# Patient Record
Sex: FEMALE | Race: WHITE | NOT HISPANIC OR LATINO
[De-identification: names, ages, dates, MRNs, and addresses within clinical notes are randomized per-mention and may not be internally consistent; named-entity substitution may affect disease eponyms.]

---

## 2017-01-10 ENCOUNTER — APPOINTMENT (OUTPATIENT)
Dept: NEUROSURGERY | Facility: CLINIC | Age: 62
End: 2017-01-10

## 2017-01-10 VITALS
SYSTOLIC BLOOD PRESSURE: 99 MMHG | BODY MASS INDEX: 28.81 KG/M2 | HEART RATE: 75 BPM | HEIGHT: 65.5 IN | WEIGHT: 175 LBS | DIASTOLIC BLOOD PRESSURE: 66 MMHG

## 2017-02-14 ENCOUNTER — FORM ENCOUNTER (OUTPATIENT)
Age: 62
End: 2017-02-14

## 2017-02-15 ENCOUNTER — OUTPATIENT (OUTPATIENT)
Dept: OUTPATIENT SERVICES | Facility: HOSPITAL | Age: 62
LOS: 1 days | End: 2017-02-15
Payer: COMMERCIAL

## 2017-02-15 ENCOUNTER — APPOINTMENT (OUTPATIENT)
Dept: MRI IMAGING | Facility: HOSPITAL | Age: 62
End: 2017-02-15

## 2017-02-15 DIAGNOSIS — G25.9 EXTRAPYRAMIDAL AND MOVEMENT DISORDER, UNSPECIFIED: ICD-10-CM

## 2017-02-15 DIAGNOSIS — G93.9 DISORDER OF BRAIN, UNSPECIFIED: ICD-10-CM

## 2017-02-15 PROCEDURE — 70553 MRI BRAIN STEM W/O & W/DYE: CPT

## 2017-02-15 PROCEDURE — 70553 MRI BRAIN STEM W/O & W/DYE: CPT | Mod: 26

## 2017-02-15 PROCEDURE — A9585: CPT

## 2017-02-24 ENCOUNTER — OUTPATIENT (OUTPATIENT)
Dept: OUTPATIENT SERVICES | Facility: HOSPITAL | Age: 62
LOS: 1 days | End: 2017-02-24
Payer: COMMERCIAL

## 2017-02-24 VITALS
HEART RATE: 55 BPM | SYSTOLIC BLOOD PRESSURE: 99 MMHG | TEMPERATURE: 98 F | WEIGHT: 176.37 LBS | OXYGEN SATURATION: 99 % | HEIGHT: 65 IN | RESPIRATION RATE: 15 BRPM | DIASTOLIC BLOOD PRESSURE: 65 MMHG

## 2017-02-24 DIAGNOSIS — G25.0 ESSENTIAL TREMOR: ICD-10-CM

## 2017-02-24 DIAGNOSIS — Z90.89 ACQUIRED ABSENCE OF OTHER ORGANS: Chronic | ICD-10-CM

## 2017-02-24 DIAGNOSIS — Z01.818 ENCOUNTER FOR OTHER PREPROCEDURAL EXAMINATION: ICD-10-CM

## 2017-02-24 DIAGNOSIS — Z90.12 ACQUIRED ABSENCE OF LEFT BREAST AND NIPPLE: Chronic | ICD-10-CM

## 2017-02-24 DIAGNOSIS — Z79.82 LONG TERM (CURRENT) USE OF ASPIRIN: ICD-10-CM

## 2017-02-24 LAB
ANION GAP SERPL CALC-SCNC: 15 MMOL/L — SIGNIFICANT CHANGE UP (ref 5–17)
BLD GP AB SCN SERPL QL: NEGATIVE — SIGNIFICANT CHANGE UP
BUN SERPL-MCNC: 12 MG/DL — SIGNIFICANT CHANGE UP (ref 7–23)
CALCIUM SERPL-MCNC: 10.2 MG/DL — SIGNIFICANT CHANGE UP (ref 8.4–10.5)
CHLORIDE SERPL-SCNC: 101 MMOL/L — SIGNIFICANT CHANGE UP (ref 96–108)
CO2 SERPL-SCNC: 25 MMOL/L — SIGNIFICANT CHANGE UP (ref 22–31)
CREAT SERPL-MCNC: 0.82 MG/DL — SIGNIFICANT CHANGE UP (ref 0.5–1.3)
GLUCOSE SERPL-MCNC: 77 MG/DL — SIGNIFICANT CHANGE UP (ref 70–99)
HCT VFR BLD CALC: 39.3 % — SIGNIFICANT CHANGE UP (ref 34.5–45)
HGB BLD-MCNC: 13 G/DL — SIGNIFICANT CHANGE UP (ref 11.5–15.5)
MCHC RBC-ENTMCNC: 31.9 PG — SIGNIFICANT CHANGE UP (ref 27–34)
MCHC RBC-ENTMCNC: 33.1 GM/DL — SIGNIFICANT CHANGE UP (ref 32–36)
MCV RBC AUTO: 96.6 FL — SIGNIFICANT CHANGE UP (ref 80–100)
PLATELET # BLD AUTO: 300 K/UL — SIGNIFICANT CHANGE UP (ref 150–400)
POTASSIUM SERPL-MCNC: 4.5 MMOL/L — SIGNIFICANT CHANGE UP (ref 3.5–5.3)
POTASSIUM SERPL-SCNC: 4.5 MMOL/L — SIGNIFICANT CHANGE UP (ref 3.5–5.3)
RBC # BLD: 4.07 M/UL — SIGNIFICANT CHANGE UP (ref 3.8–5.2)
RBC # FLD: 14 % — SIGNIFICANT CHANGE UP (ref 10.3–14.5)
RH IG SCN BLD-IMP: POSITIVE — SIGNIFICANT CHANGE UP
SODIUM SERPL-SCNC: 141 MMOL/L — SIGNIFICANT CHANGE UP (ref 135–145)
WBC # BLD: 3.91 K/UL — SIGNIFICANT CHANGE UP (ref 3.8–10.5)
WBC # FLD AUTO: 3.91 K/UL — SIGNIFICANT CHANGE UP (ref 3.8–10.5)

## 2017-02-24 RX ORDER — SODIUM CHLORIDE 9 MG/ML
3 INJECTION INTRAMUSCULAR; INTRAVENOUS; SUBCUTANEOUS EVERY 8 HOURS
Qty: 0 | Refills: 0 | Status: DISCONTINUED | OUTPATIENT
Start: 2017-03-02 | End: 2017-03-03

## 2017-02-24 NOTE — H&P PST ADULT - HISTORY OF PRESENT ILLNESS
60 yo female with essential tremor, who is intolerant of medication dosage required to adequately manage tremor, presents for left VIM DBS placement on 3/2/17.

## 2017-02-24 NOTE — H&P PST ADULT - PROBLEM SELECTOR PLAN 1
Stereotactic frame placement  Left VIM DBS  Pt states that she believes she was instructed to hold anti-tremor meds on am of surgery. Call placed and message left with Dr Redding's office to verify instructions. Per Dr Redding's office, he will contact patient with instructions regarding Inderal and Klonopin on am of surgery.

## 2017-02-24 NOTE — H&P PST ADULT - PMH
Breast cancer    Essential tremor    Hyperlipidemia Anxiety    Breast cancer  2009 left - Stage 1 - s/p left mastectomy - no chemo/radiation  Essential tremor    Hyperlipidemia

## 2017-02-24 NOTE — H&P PST ADULT - NSANTHOSAYNRD_GEN_A_CORE
No. ROSANGELA screening performed.  STOP BANG Legend: 0-2 = LOW Risk; 3-4 = INTERMEDIATE Risk; 5-8 = HIGH Risk

## 2017-03-02 ENCOUNTER — APPOINTMENT (OUTPATIENT)
Dept: NEUROSURGERY | Facility: HOSPITAL | Age: 62
End: 2017-03-02

## 2017-03-02 ENCOUNTER — TRANSCRIPTION ENCOUNTER (OUTPATIENT)
Age: 62
End: 2017-03-02

## 2017-03-02 ENCOUNTER — INPATIENT (INPATIENT)
Facility: HOSPITAL | Age: 62
LOS: 0 days | Discharge: HOME | DRG: 27 | End: 2017-03-03
Attending: STUDENT IN AN ORGANIZED HEALTH CARE EDUCATION/TRAINING PROGRAM | Admitting: STUDENT IN AN ORGANIZED HEALTH CARE EDUCATION/TRAINING PROGRAM
Payer: COMMERCIAL

## 2017-03-02 VITALS
RESPIRATION RATE: 18 BRPM | HEIGHT: 65 IN | TEMPERATURE: 37 F | OXYGEN SATURATION: 99 % | WEIGHT: 176.37 LBS | DIASTOLIC BLOOD PRESSURE: 64 MMHG | SYSTOLIC BLOOD PRESSURE: 103 MMHG | HEART RATE: 50 BPM

## 2017-03-02 DIAGNOSIS — Z90.89 ACQUIRED ABSENCE OF OTHER ORGANS: Chronic | ICD-10-CM

## 2017-03-02 DIAGNOSIS — Z90.12 ACQUIRED ABSENCE OF LEFT BREAST AND NIPPLE: Chronic | ICD-10-CM

## 2017-03-02 DIAGNOSIS — G25.0 ESSENTIAL TREMOR: ICD-10-CM

## 2017-03-02 LAB
ANION GAP SERPL CALC-SCNC: 8 MMOL/L — SIGNIFICANT CHANGE UP (ref 5–17)
BASOPHILS # BLD AUTO: 0 K/UL — SIGNIFICANT CHANGE UP (ref 0–0.2)
BASOPHILS NFR BLD AUTO: 0.6 % — SIGNIFICANT CHANGE UP (ref 0–2)
CHLORIDE SERPL-SCNC: 107 MMOL/L — SIGNIFICANT CHANGE UP (ref 96–108)
CO2 SERPL-SCNC: 28 MMOL/L — SIGNIFICANT CHANGE UP (ref 22–31)
EOSINOPHIL # BLD AUTO: 0.1 K/UL — SIGNIFICANT CHANGE UP (ref 0–0.5)
EOSINOPHIL NFR BLD AUTO: 2.6 % — SIGNIFICANT CHANGE UP (ref 0–6)
HCT VFR BLD CALC: 37.4 % — SIGNIFICANT CHANGE UP (ref 34.5–45)
HGB BLD-MCNC: 12.4 G/DL — SIGNIFICANT CHANGE UP (ref 11.5–15.5)
LYMPHOCYTES # BLD AUTO: 1.6 K/UL — SIGNIFICANT CHANGE UP (ref 1–3.3)
LYMPHOCYTES # BLD AUTO: 30.2 % — SIGNIFICANT CHANGE UP (ref 13–44)
MCHC RBC-ENTMCNC: 32.3 PG — SIGNIFICANT CHANGE UP (ref 27–34)
MCHC RBC-ENTMCNC: 33.2 GM/DL — SIGNIFICANT CHANGE UP (ref 32–36)
MCV RBC AUTO: 97.2 FL — SIGNIFICANT CHANGE UP (ref 80–100)
MONOCYTES # BLD AUTO: 0.4 K/UL — SIGNIFICANT CHANGE UP (ref 0–0.9)
MONOCYTES NFR BLD AUTO: 8.5 % — SIGNIFICANT CHANGE UP (ref 2–14)
NEUTROPHILS # BLD AUTO: 3 K/UL — SIGNIFICANT CHANGE UP (ref 1.8–7.4)
NEUTROPHILS NFR BLD AUTO: 58.1 % — SIGNIFICANT CHANGE UP (ref 43–77)
PLATELET # BLD AUTO: 236 K/UL — SIGNIFICANT CHANGE UP (ref 150–400)
POTASSIUM SERPL-MCNC: 5.4 MMOL/L — HIGH (ref 3.5–5.3)
POTASSIUM SERPL-SCNC: 5.4 MMOL/L — HIGH (ref 3.5–5.3)
RBC # BLD: 3.85 M/UL — SIGNIFICANT CHANGE UP (ref 3.8–5.2)
RBC # FLD: 12.2 % — SIGNIFICANT CHANGE UP (ref 10.3–14.5)
RH IG SCN BLD-IMP: POSITIVE — SIGNIFICANT CHANGE UP
SODIUM SERPL-SCNC: 143 MMOL/L — SIGNIFICANT CHANGE UP (ref 135–145)
WBC # BLD: 5.2 K/UL — SIGNIFICANT CHANGE UP (ref 3.8–10.5)
WBC # FLD AUTO: 5.2 K/UL — SIGNIFICANT CHANGE UP (ref 3.8–10.5)

## 2017-03-02 PROCEDURE — 61867 IMPLANT NEUROELECTRODE: CPT

## 2017-03-02 PROCEDURE — 70450 CT HEAD/BRAIN W/O DYE: CPT | Mod: 26,77

## 2017-03-02 PROCEDURE — 70450 CT HEAD/BRAIN W/O DYE: CPT | Mod: 26

## 2017-03-02 RX ORDER — SODIUM CHLORIDE 9 MG/ML
1000 INJECTION INTRAMUSCULAR; INTRAVENOUS; SUBCUTANEOUS
Qty: 0 | Refills: 0 | Status: DISCONTINUED | OUTPATIENT
Start: 2017-03-02 | End: 2017-03-03

## 2017-03-02 RX ORDER — ONDANSETRON 8 MG/1
4 TABLET, FILM COATED ORAL ONCE
Qty: 0 | Refills: 0 | Status: DISCONTINUED | OUTPATIENT
Start: 2017-03-02 | End: 2017-03-02

## 2017-03-02 RX ORDER — ACETAMINOPHEN 500 MG
650 TABLET ORAL EVERY 6 HOURS
Qty: 0 | Refills: 0 | Status: DISCONTINUED | OUTPATIENT
Start: 2017-03-02 | End: 2017-03-03

## 2017-03-02 RX ORDER — HYDROMORPHONE HYDROCHLORIDE 2 MG/ML
0.5 INJECTION INTRAMUSCULAR; INTRAVENOUS; SUBCUTANEOUS
Qty: 0 | Refills: 0 | Status: DISCONTINUED | OUTPATIENT
Start: 2017-03-02 | End: 2017-03-02

## 2017-03-02 RX ORDER — CITALOPRAM 10 MG/1
20 TABLET, FILM COATED ORAL DAILY
Qty: 0 | Refills: 0 | Status: DISCONTINUED | OUTPATIENT
Start: 2017-03-02 | End: 2017-03-03

## 2017-03-02 RX ORDER — CLONAZEPAM 1 MG
1 TABLET ORAL THREE TIMES A DAY
Qty: 0 | Refills: 0 | Status: DISCONTINUED | OUTPATIENT
Start: 2017-03-02 | End: 2017-03-03

## 2017-03-02 RX ORDER — PROPRANOLOL HCL 160 MG
20 CAPSULE, EXTENDED RELEASE 24HR ORAL THREE TIMES A DAY
Qty: 0 | Refills: 0 | Status: DISCONTINUED | OUTPATIENT
Start: 2017-03-02 | End: 2017-03-03

## 2017-03-02 RX ORDER — ATORVASTATIN CALCIUM 80 MG/1
10 TABLET, FILM COATED ORAL AT BEDTIME
Qty: 0 | Refills: 0 | Status: DISCONTINUED | OUTPATIENT
Start: 2017-03-02 | End: 2017-03-03

## 2017-03-02 RX ORDER — PANTOPRAZOLE SODIUM 20 MG/1
40 TABLET, DELAYED RELEASE ORAL DAILY
Qty: 0 | Refills: 0 | Status: DISCONTINUED | OUTPATIENT
Start: 2017-03-02 | End: 2017-03-03

## 2017-03-02 RX ORDER — DOCUSATE SODIUM 100 MG
100 CAPSULE ORAL THREE TIMES A DAY
Qty: 0 | Refills: 0 | Status: DISCONTINUED | OUTPATIENT
Start: 2017-03-02 | End: 2017-03-03

## 2017-03-02 RX ORDER — DEXTROSE MONOHYDRATE, SODIUM CHLORIDE, AND POTASSIUM CHLORIDE 50; .745; 4.5 G/1000ML; G/1000ML; G/1000ML
1000 INJECTION, SOLUTION INTRAVENOUS
Qty: 0 | Refills: 0 | Status: DISCONTINUED | OUTPATIENT
Start: 2017-03-02 | End: 2017-03-02

## 2017-03-02 RX ORDER — SENNA PLUS 8.6 MG/1
2 TABLET ORAL AT BEDTIME
Qty: 0 | Refills: 0 | Status: DISCONTINUED | OUTPATIENT
Start: 2017-03-02 | End: 2017-03-03

## 2017-03-02 RX ADMIN — SODIUM CHLORIDE 3 MILLILITER(S): 9 INJECTION INTRAMUSCULAR; INTRAVENOUS; SUBCUTANEOUS at 23:13

## 2017-03-02 RX ADMIN — Medication 1 MILLIGRAM(S): at 23:12

## 2017-03-02 RX ADMIN — SODIUM CHLORIDE 75 MILLILITER(S): 9 INJECTION INTRAMUSCULAR; INTRAVENOUS; SUBCUTANEOUS at 18:57

## 2017-03-02 RX ADMIN — Medication 100 MILLIGRAM(S): at 17:32

## 2017-03-02 RX ADMIN — Medication 100 MILLIGRAM(S): at 23:12

## 2017-03-02 RX ADMIN — SODIUM CHLORIDE 3 MILLILITER(S): 9 INJECTION INTRAMUSCULAR; INTRAVENOUS; SUBCUTANEOUS at 17:01

## 2017-03-02 RX ADMIN — CITALOPRAM 20 MILLIGRAM(S): 10 TABLET, FILM COATED ORAL at 23:12

## 2017-03-02 RX ADMIN — PANTOPRAZOLE SODIUM 40 MILLIGRAM(S): 20 TABLET, DELAYED RELEASE ORAL at 23:12

## 2017-03-02 RX ADMIN — SODIUM CHLORIDE 75 MILLILITER(S): 9 INJECTION INTRAMUSCULAR; INTRAVENOUS; SUBCUTANEOUS at 23:13

## 2017-03-02 RX ADMIN — ATORVASTATIN CALCIUM 10 MILLIGRAM(S): 80 TABLET, FILM COATED ORAL at 23:12

## 2017-03-02 NOTE — PATIENT PROFILE ADULT. - PMH
Anxiety    Breast cancer  2009 left - Stage 1 - s/p left mastectomy - no chemo/radiation  Essential tremor    Hyperlipidemia

## 2017-03-03 VITALS
DIASTOLIC BLOOD PRESSURE: 69 MMHG | SYSTOLIC BLOOD PRESSURE: 114 MMHG | RESPIRATION RATE: 16 BRPM | OXYGEN SATURATION: 98 % | TEMPERATURE: 99 F | HEART RATE: 73 BPM

## 2017-03-03 LAB
ANION GAP SERPL CALC-SCNC: 13 MMOL/L — SIGNIFICANT CHANGE UP (ref 5–17)
BUN SERPL-MCNC: 9 MG/DL — SIGNIFICANT CHANGE UP (ref 7–23)
CALCIUM SERPL-MCNC: 9.2 MG/DL — SIGNIFICANT CHANGE UP (ref 8.4–10.5)
CHLORIDE SERPL-SCNC: 102 MMOL/L — SIGNIFICANT CHANGE UP (ref 96–108)
CO2 SERPL-SCNC: 24 MMOL/L — SIGNIFICANT CHANGE UP (ref 22–31)
CREAT SERPL-MCNC: 0.67 MG/DL — SIGNIFICANT CHANGE UP (ref 0.5–1.3)
GLUCOSE SERPL-MCNC: 130 MG/DL — HIGH (ref 70–99)
HCT VFR BLD CALC: 39.7 % — SIGNIFICANT CHANGE UP (ref 34.5–45)
HGB BLD-MCNC: 13.2 G/DL — SIGNIFICANT CHANGE UP (ref 11.5–15.5)
MCHC RBC-ENTMCNC: 31.7 PG — SIGNIFICANT CHANGE UP (ref 27–34)
MCHC RBC-ENTMCNC: 33.2 GM/DL — SIGNIFICANT CHANGE UP (ref 32–36)
MCV RBC AUTO: 95.5 FL — SIGNIFICANT CHANGE UP (ref 80–100)
PLATELET # BLD AUTO: 212 K/UL — SIGNIFICANT CHANGE UP (ref 150–400)
POTASSIUM SERPL-MCNC: 4.1 MMOL/L — SIGNIFICANT CHANGE UP (ref 3.5–5.3)
POTASSIUM SERPL-SCNC: 4.1 MMOL/L — SIGNIFICANT CHANGE UP (ref 3.5–5.3)
RBC # BLD: 4.15 M/UL — SIGNIFICANT CHANGE UP (ref 3.8–5.2)
RBC # FLD: 12.6 % — SIGNIFICANT CHANGE UP (ref 10.3–14.5)
SODIUM SERPL-SCNC: 139 MMOL/L — SIGNIFICANT CHANGE UP (ref 135–145)
WBC # BLD: 11.9 K/UL — HIGH (ref 3.8–10.5)
WBC # FLD AUTO: 11.9 K/UL — HIGH (ref 3.8–10.5)

## 2017-03-03 PROCEDURE — 97161 PT EVAL LOW COMPLEX 20 MIN: CPT

## 2017-03-03 PROCEDURE — 80048 BASIC METABOLIC PNL TOTAL CA: CPT

## 2017-03-03 PROCEDURE — 86900 BLOOD TYPING SEROLOGIC ABO: CPT

## 2017-03-03 PROCEDURE — 85027 COMPLETE CBC AUTOMATED: CPT

## 2017-03-03 PROCEDURE — C1713: CPT

## 2017-03-03 PROCEDURE — 76000 FLUOROSCOPY <1 HR PHYS/QHP: CPT

## 2017-03-03 PROCEDURE — C1889: CPT

## 2017-03-03 PROCEDURE — 80051 ELECTROLYTE PANEL: CPT

## 2017-03-03 PROCEDURE — 86901 BLOOD TYPING SEROLOGIC RH(D): CPT

## 2017-03-03 PROCEDURE — C1778: CPT

## 2017-03-03 PROCEDURE — 70450 CT HEAD/BRAIN W/O DYE: CPT

## 2017-03-03 RX ORDER — ACETAMINOPHEN 500 MG
2 TABLET ORAL
Qty: 0 | Refills: 0 | COMMUNITY
Start: 2017-03-03

## 2017-03-03 RX ORDER — SODIUM CHLORIDE 9 MG/ML
500 INJECTION INTRAMUSCULAR; INTRAVENOUS; SUBCUTANEOUS ONCE
Qty: 0 | Refills: 0 | Status: COMPLETED | OUTPATIENT
Start: 2017-03-03 | End: 2017-03-03

## 2017-03-03 RX ORDER — ASPIRIN/CALCIUM CARB/MAGNESIUM 324 MG
1 TABLET ORAL
Qty: 0 | Refills: 0 | COMMUNITY

## 2017-03-03 RX ORDER — DOCUSATE SODIUM 100 MG
1 CAPSULE ORAL
Qty: 0 | Refills: 0 | COMMUNITY
Start: 2017-03-03

## 2017-03-03 RX ADMIN — Medication 20 MILLIGRAM(S): at 00:14

## 2017-03-03 RX ADMIN — SODIUM CHLORIDE 3 MILLILITER(S): 9 INJECTION INTRAMUSCULAR; INTRAVENOUS; SUBCUTANEOUS at 12:53

## 2017-03-03 RX ADMIN — PANTOPRAZOLE SODIUM 40 MILLIGRAM(S): 20 TABLET, DELAYED RELEASE ORAL at 13:11

## 2017-03-03 RX ADMIN — Medication 100 MILLIGRAM(S): at 05:26

## 2017-03-03 RX ADMIN — Medication 100 MILLIGRAM(S): at 00:18

## 2017-03-03 RX ADMIN — Medication 100 MILLIGRAM(S): at 08:32

## 2017-03-03 RX ADMIN — Medication 1 MILLIGRAM(S): at 14:16

## 2017-03-03 RX ADMIN — SODIUM CHLORIDE 75 MILLILITER(S): 9 INJECTION INTRAMUSCULAR; INTRAVENOUS; SUBCUTANEOUS at 05:26

## 2017-03-03 RX ADMIN — SODIUM CHLORIDE 75 MILLILITER(S): 9 INJECTION INTRAMUSCULAR; INTRAVENOUS; SUBCUTANEOUS at 03:30

## 2017-03-03 RX ADMIN — SODIUM CHLORIDE 3 MILLILITER(S): 9 INJECTION INTRAMUSCULAR; INTRAVENOUS; SUBCUTANEOUS at 05:26

## 2017-03-03 RX ADMIN — Medication 20 MILLIGRAM(S): at 05:26

## 2017-03-03 RX ADMIN — SODIUM CHLORIDE 1000 MILLILITER(S): 9 INJECTION INTRAMUSCULAR; INTRAVENOUS; SUBCUTANEOUS at 13:40

## 2017-03-03 RX ADMIN — Medication 1 MILLIGRAM(S): at 05:26

## 2017-03-03 RX ADMIN — Medication 20 MILLIGRAM(S): at 15:45

## 2017-03-03 RX ADMIN — CITALOPRAM 20 MILLIGRAM(S): 10 TABLET, FILM COATED ORAL at 13:11

## 2017-03-03 NOTE — DISCHARGE NOTE ADULT - REASON FOR ADMISSION
"I am having a deep brain stimulator placed." 60 yo female with essential tremor, who is intolerant of medication dosage required to adequately manage tremor, presents for left VIM DBS placement on 3/2/17.

## 2017-03-03 NOTE — DISCHARGE NOTE ADULT - NS AS ACTIVITY OBS
Showering allowed/Walking-Outdoors allowed/Stairs allowed/Do not make important decisions/Do not drive or operate machinery/No Heavy lifting/straining/Walking-Indoors allowed

## 2017-03-03 NOTE — PHYSICAL THERAPY INITIAL EVALUATION ADULT - OCCUPATION
Pt reports she lives with spouse in 3-story PH with 3-5 steps to enter & full flight to bedroom/bathroom both with CARIDAD handrails.  Pt reports she was independent with all mobility PTA, no AD for ambulation.

## 2017-03-03 NOTE — DISCHARGE NOTE ADULT - NS AS DC STROKE ED MATERIALS
Need for Followup After Discharge/Stroke Education Booklet/Stroke Warning Signs and Symptoms/Call 911 for Stroke/Risk Factors for Stroke/Prescribed Medications

## 2017-03-03 NOTE — DISCHARGE NOTE ADULT - MEDICATION SUMMARY - MEDICATIONS TO STOP TAKING
I will STOP taking the medications listed below when I get home from the hospital:    aspirin 81 mg oral tablet  -- 1 tab(s) by mouth once a day - on hold to OR

## 2017-03-03 NOTE — PHYSICAL THERAPY INITIAL EVALUATION ADULT - ADDITIONAL COMMENTS
3/02 CT head: Interval placement of a left frontal approach deep brain stimulator with   postoperative changes. No acute intracranial hemorrhage or mass effect.

## 2017-03-03 NOTE — DISCHARGE NOTE ADULT - CARE PROVIDERS DIRECT ADDRESSES
[email or web address]

## 2017-03-03 NOTE — DISCHARGE NOTE ADULT - PATIENT PORTAL LINK FT
“You can access the FollowHealth Patient Portal, offered by [facility], by registering with the following website: [email or web address]”

## 2017-03-03 NOTE — DISCHARGE NOTE ADULT - CARE PROVIDER_API CALL
Michael Redding), Neurosurgery  General  [address] [place] [address]  Phone: [phone]  Fax: [phone]

## 2017-03-03 NOTE — DISCHARGE NOTE ADULT - PLAN OF CARE
INCREASE ACTIVITY AND CONTROL TREMOR. FOLLOW UP WITH DR. BETANCOURT IN ONE WEEK   FOLLOW UP WITH PMD IN 2 WEEKS

## 2017-03-03 NOTE — DISCHARGE NOTE ADULT - HOSPITAL COURSE
PATIENT HAD DEEP BRAIN STIMULATOR PLACED FOR ESSENTIAL TREMOR ON 3/2. POST OP PATIENT WAS MONITORED IN THE PACU AND WAS TRANSFERRED TO FLOOR. ON THE FLOOR PATIENT WAS GIVEN PAIN MEDS AND WAS STARTED BACK ON ROUTINE HOME MEDS. PATIENT WAS NOT NOTICED TO HAVE ANY NEW DEFICITS AND PHYSICAL THERAPY HAS CLEARED PATIENT. RAUSCH CATHETER WAS DCD ON 3/3 AND PATIENT VOIDED.

## 2017-03-03 NOTE — DISCHARGE NOTE ADULT - ADDITIONAL INSTRUCTIONS
MAY TAKE SHOWER 4 DAYS AFTER SURGERY-LET WATER RUN OVER THE SURGICAL SITE AND PAT DRY AFTER SHOWER-KEEP SURGICAL SITE CLEAN AND DRY AT ALL TIMES.

## 2017-03-03 NOTE — PHYSICAL THERAPY INITIAL EVALUATION ADULT - TRANSFER SAFETY CONCERNS NOTED: SIT/STAND, REHAB EVAL
able to self-correct/decreased step length/decreased balance during turns/decreased weight-shifting ability

## 2017-03-03 NOTE — DISCHARGE NOTE ADULT - CARE PLAN
Principal Discharge DX:	Essential tremor  Goal:	INCREASE ACTIVITY AND CONTROL TREMOR.  Instructions for follow-up, activity and diet:	FOLLOW UP WITH DR. BETANCOURT IN ONE WEEK   FOLLOW UP WITH PMD IN 2 WEEKS  Secondary Diagnosis:	Anxiety  Secondary Diagnosis:	Hyperlipidemia  Secondary Diagnosis:	Breast cancer

## 2017-03-09 ENCOUNTER — APPOINTMENT (OUTPATIENT)
Dept: NEUROSURGERY | Facility: HOSPITAL | Age: 62
End: 2017-03-09

## 2017-03-09 ENCOUNTER — TRANSCRIPTION ENCOUNTER (OUTPATIENT)
Age: 62
End: 2017-03-09

## 2017-03-09 ENCOUNTER — OUTPATIENT (OUTPATIENT)
Dept: OUTPATIENT SERVICES | Facility: HOSPITAL | Age: 62
LOS: 1 days | Discharge: HOME | End: 2017-03-09
Payer: COMMERCIAL

## 2017-03-09 VITALS
OXYGEN SATURATION: 98 % | SYSTOLIC BLOOD PRESSURE: 123 MMHG | HEART RATE: 75 BPM | DIASTOLIC BLOOD PRESSURE: 58 MMHG | RESPIRATION RATE: 20 BRPM | TEMPERATURE: 98 F

## 2017-03-09 VITALS
SYSTOLIC BLOOD PRESSURE: 108 MMHG | HEART RATE: 63 BPM | DIASTOLIC BLOOD PRESSURE: 66 MMHG | RESPIRATION RATE: 14 BRPM | OXYGEN SATURATION: 96 %

## 2017-03-09 DIAGNOSIS — Z90.12 ACQUIRED ABSENCE OF LEFT BREAST AND NIPPLE: Chronic | ICD-10-CM

## 2017-03-09 DIAGNOSIS — G25.0 ESSENTIAL TREMOR: ICD-10-CM

## 2017-03-09 DIAGNOSIS — Z90.89 ACQUIRED ABSENCE OF OTHER ORGANS: Chronic | ICD-10-CM

## 2017-03-09 PROCEDURE — C1787: CPT

## 2017-03-09 PROCEDURE — 61885 INSRT/REDO NEUROSTIM 1 ARRAY: CPT | Mod: 58,LT

## 2017-03-09 PROCEDURE — C1767: CPT

## 2017-03-09 PROCEDURE — C1883: CPT

## 2017-03-09 PROCEDURE — 63685 INS/RPLC SPI NPG/RCVR POCKET: CPT

## 2017-03-09 RX ORDER — DOCUSATE SODIUM 100 MG
100 CAPSULE ORAL THREE TIMES A DAY
Qty: 0 | Refills: 0 | Status: DISCONTINUED | OUTPATIENT
Start: 2017-03-09 | End: 2017-03-24

## 2017-03-09 RX ORDER — SODIUM CHLORIDE 9 MG/ML
3 INJECTION INTRAMUSCULAR; INTRAVENOUS; SUBCUTANEOUS EVERY 8 HOURS
Qty: 0 | Refills: 0 | Status: DISCONTINUED | OUTPATIENT
Start: 2017-03-09 | End: 2017-03-09

## 2017-03-09 RX ORDER — PANTOPRAZOLE SODIUM 20 MG/1
40 TABLET, DELAYED RELEASE ORAL DAILY
Qty: 0 | Refills: 0 | Status: DISCONTINUED | OUTPATIENT
Start: 2017-03-09 | End: 2017-03-24

## 2017-03-09 RX ORDER — PROPRANOLOL HCL 160 MG
20 CAPSULE, EXTENDED RELEASE 24HR ORAL THREE TIMES A DAY
Qty: 0 | Refills: 0 | Status: DISCONTINUED | OUTPATIENT
Start: 2017-03-09 | End: 2017-03-24

## 2017-03-09 RX ORDER — ONDANSETRON 8 MG/1
4 TABLET, FILM COATED ORAL ONCE
Qty: 0 | Refills: 0 | Status: DISCONTINUED | OUTPATIENT
Start: 2017-03-09 | End: 2017-03-09

## 2017-03-09 RX ORDER — CITALOPRAM 10 MG/1
20 TABLET, FILM COATED ORAL DAILY
Qty: 0 | Refills: 0 | Status: DISCONTINUED | OUTPATIENT
Start: 2017-03-09 | End: 2017-03-24

## 2017-03-09 RX ORDER — SENNA PLUS 8.6 MG/1
2 TABLET ORAL AT BEDTIME
Qty: 0 | Refills: 0 | Status: DISCONTINUED | OUTPATIENT
Start: 2017-03-09 | End: 2017-03-24

## 2017-03-09 RX ORDER — OXYCODONE HYDROCHLORIDE 5 MG/1
1 TABLET ORAL
Qty: 12 | Refills: 0 | OUTPATIENT
Start: 2017-03-09 | End: 2017-03-12

## 2017-03-09 RX ORDER — HYDROMORPHONE HYDROCHLORIDE 2 MG/ML
0.5 INJECTION INTRAMUSCULAR; INTRAVENOUS; SUBCUTANEOUS
Qty: 0 | Refills: 0 | Status: DISCONTINUED | OUTPATIENT
Start: 2017-03-09 | End: 2017-03-09

## 2017-03-09 RX ORDER — ATORVASTATIN CALCIUM 80 MG/1
10 TABLET, FILM COATED ORAL AT BEDTIME
Qty: 0 | Refills: 0 | Status: DISCONTINUED | OUTPATIENT
Start: 2017-03-09 | End: 2017-03-24

## 2017-03-09 RX ORDER — CLONAZEPAM 1 MG
1 TABLET ORAL THREE TIMES A DAY
Qty: 0 | Refills: 0 | Status: COMPLETED | OUTPATIENT
Start: 2017-03-09 | End: 2017-03-16

## 2017-03-09 RX ORDER — ZOLPIDEM TARTRATE 10 MG/1
5 TABLET ORAL AT BEDTIME
Qty: 0 | Refills: 0 | Status: DISCONTINUED | OUTPATIENT
Start: 2017-03-09 | End: 2017-03-09

## 2017-03-09 RX ORDER — ONDANSETRON 8 MG/1
4 TABLET, FILM COATED ORAL EVERY 6 HOURS
Qty: 0 | Refills: 0 | Status: DISCONTINUED | OUTPATIENT
Start: 2017-03-09 | End: 2017-03-24

## 2017-03-09 RX ORDER — DEXTROSE MONOHYDRATE, SODIUM CHLORIDE, AND POTASSIUM CHLORIDE 50; .745; 4.5 G/1000ML; G/1000ML; G/1000ML
1000 INJECTION, SOLUTION INTRAVENOUS
Qty: 0 | Refills: 0 | Status: DISCONTINUED | OUTPATIENT
Start: 2017-03-09 | End: 2017-03-24

## 2017-03-09 NOTE — ASU DISCHARGE PLAN (ADULT/PEDIATRIC). - MEDICATION SUMMARY - MEDICATIONS TO TAKE
I will START or STAY ON the medications listed below when I get home from the hospital:    calcium  -- 500 milligram(s) by mouth once a day  -- Indication: For Essential tremor    acetaminophen 325 mg oral tablet  -- 2 tab(s) by mouth every 6 hours, As needed, Mild Pain (1 - 3)  -- Indication: For Essential tremor    acetaminophen-oxyCODONE 325 mg-5 mg oral tablet  -- 1 tab(s) by mouth every 6 hours, As Needed -for shortness of breath and/or wheezing -for severe pain MDD:4  -- Caution federal law prohibits the transfer of this drug to any person other  than the person for whom it was prescribed.  May cause drowsiness.  Alcohol may intensify this effect.  Use care when operating dangerous machinery.  This prescription cannot be refilled.  This product contains acetaminophen.  Do not use  with any other product containing acetaminophen to prevent possible liver damage.  Using more of this medication than prescribed may cause serious breathing problems.    -- Indication: For Essential tremor    Inderal 20 mg oral tablet  -- 1 tab(s) by mouth 3 times a day  -- Indication: For Essential tremor    KlonoPIN 1 mg oral tablet  -- 1 tab(s) by mouth 3 times a day  -- Indication: For Essential tremor    CeleXA 20 mg oral tablet  -- 1 tab(s) by mouth once a day  -- Indication: For Essential tremor    Lipitor 10 mg oral tablet  -- 1 tab(s) by mouth once a day  -- Indication: For Essential tremor    Halcion 0.25 mg oral tablet  -- 1 tab(s) by mouth once a day (at bedtime)  -- Indication: For Essential tremor    clindamycin 300 mg oral capsule  -- 1 cap(s) by mouth every 8 hours MDD:3  -- Finish all this medication unless otherwise directed by prescriber.  Medication should be taken with plenty of water.    -- Indication: For Essential tremor    multivitamin  -- 1 tab(s) by mouth once a day  -- Indication: For Essential tremor    Vitamin D3 1000 intl units oral capsule  -- 1 cap(s) by mouth once a day  -- Indication: For Essential tremor

## 2017-03-09 NOTE — H&P PST ADULT - HISTORY OF PRESENT ILLNESS
60 yo female with essential tremor, who is intolerant of medication dosage required to adequately manage tremor, presents for left DBS IPG placement on 3/9/17.

## 2017-03-13 DIAGNOSIS — Z88.2 ALLERGY STATUS TO SULFONAMIDES: ICD-10-CM

## 2017-03-13 DIAGNOSIS — Z85.3 PERSONAL HISTORY OF MALIGNANT NEOPLASM OF BREAST: ICD-10-CM

## 2017-03-13 DIAGNOSIS — Z88.0 ALLERGY STATUS TO PENICILLIN: ICD-10-CM

## 2017-03-13 DIAGNOSIS — F41.9 ANXIETY DISORDER, UNSPECIFIED: ICD-10-CM

## 2017-03-13 DIAGNOSIS — G25.0 ESSENTIAL TREMOR: ICD-10-CM

## 2017-03-16 ENCOUNTER — TRANSCRIPTION ENCOUNTER (OUTPATIENT)
Age: 62
End: 2017-03-16

## 2017-03-16 ENCOUNTER — INPATIENT (INPATIENT)
Facility: HOSPITAL | Age: 62
LOS: 0 days | Discharge: HOME | DRG: 27 | End: 2017-03-17
Attending: STUDENT IN AN ORGANIZED HEALTH CARE EDUCATION/TRAINING PROGRAM | Admitting: STUDENT IN AN ORGANIZED HEALTH CARE EDUCATION/TRAINING PROGRAM
Payer: COMMERCIAL

## 2017-03-16 ENCOUNTER — APPOINTMENT (OUTPATIENT)
Dept: NEUROSURGERY | Facility: HOSPITAL | Age: 62
End: 2017-03-16

## 2017-03-16 VITALS
DIASTOLIC BLOOD PRESSURE: 70 MMHG | SYSTOLIC BLOOD PRESSURE: 107 MMHG | WEIGHT: 175.05 LBS | TEMPERATURE: 98 F | OXYGEN SATURATION: 97 % | HEART RATE: 74 BPM | RESPIRATION RATE: 18 BRPM | HEIGHT: 65 IN

## 2017-03-16 DIAGNOSIS — G25.0 ESSENTIAL TREMOR: ICD-10-CM

## 2017-03-16 DIAGNOSIS — Z90.89 ACQUIRED ABSENCE OF OTHER ORGANS: Chronic | ICD-10-CM

## 2017-03-16 DIAGNOSIS — Z90.12 ACQUIRED ABSENCE OF LEFT BREAST AND NIPPLE: Chronic | ICD-10-CM

## 2017-03-16 LAB
ALBUMIN SERPL ELPH-MCNC: 4 G/DL — SIGNIFICANT CHANGE UP (ref 3.3–5)
ALP SERPL-CCNC: 78 U/L — SIGNIFICANT CHANGE UP (ref 40–120)
ALT FLD-CCNC: 42 U/L RC — SIGNIFICANT CHANGE UP (ref 10–45)
ANION GAP SERPL CALC-SCNC: 14 MMOL/L — SIGNIFICANT CHANGE UP (ref 5–17)
AST SERPL-CCNC: 23 U/L — SIGNIFICANT CHANGE UP (ref 10–40)
BASOPHILS # BLD AUTO: 0.1 K/UL — SIGNIFICANT CHANGE UP (ref 0–0.2)
BASOPHILS NFR BLD AUTO: 0.6 % — SIGNIFICANT CHANGE UP (ref 0–2)
BILIRUB SERPL-MCNC: 0.5 MG/DL — SIGNIFICANT CHANGE UP (ref 0.2–1.2)
BUN SERPL-MCNC: 18 MG/DL — SIGNIFICANT CHANGE UP (ref 7–23)
CALCIUM SERPL-MCNC: 8.9 MG/DL — SIGNIFICANT CHANGE UP (ref 8.4–10.5)
CHLORIDE SERPL-SCNC: 104 MMOL/L — SIGNIFICANT CHANGE UP (ref 96–108)
CO2 SERPL-SCNC: 23 MMOL/L — SIGNIFICANT CHANGE UP (ref 22–31)
CREAT SERPL-MCNC: 0.6 MG/DL — SIGNIFICANT CHANGE UP (ref 0.5–1.3)
EOSINOPHIL # BLD AUTO: 0.1 K/UL — SIGNIFICANT CHANGE UP (ref 0–0.5)
EOSINOPHIL NFR BLD AUTO: 0.7 % — SIGNIFICANT CHANGE UP (ref 0–6)
GLUCOSE SERPL-MCNC: 126 MG/DL — HIGH (ref 70–99)
HCT VFR BLD CALC: 38 % — SIGNIFICANT CHANGE UP (ref 34.5–45)
HGB BLD-MCNC: 12.9 G/DL — SIGNIFICANT CHANGE UP (ref 11.5–15.5)
LYMPHOCYTES # BLD AUTO: 0.8 K/UL — LOW (ref 1–3.3)
LYMPHOCYTES # BLD AUTO: 7.8 % — LOW (ref 13–44)
MCHC RBC-ENTMCNC: 33.1 PG — SIGNIFICANT CHANGE UP (ref 27–34)
MCHC RBC-ENTMCNC: 34 GM/DL — SIGNIFICANT CHANGE UP (ref 32–36)
MCV RBC AUTO: 97.2 FL — SIGNIFICANT CHANGE UP (ref 80–100)
MONOCYTES # BLD AUTO: 0.2 K/UL — SIGNIFICANT CHANGE UP (ref 0–0.9)
MONOCYTES NFR BLD AUTO: 2.2 % — SIGNIFICANT CHANGE UP (ref 2–14)
NEUTROPHILS # BLD AUTO: 9.7 K/UL — HIGH (ref 1.8–7.4)
NEUTROPHILS NFR BLD AUTO: 88.7 % — HIGH (ref 43–77)
PLATELET # BLD AUTO: 273 K/UL — SIGNIFICANT CHANGE UP (ref 150–400)
POTASSIUM SERPL-MCNC: 4.1 MMOL/L — SIGNIFICANT CHANGE UP (ref 3.5–5.3)
POTASSIUM SERPL-SCNC: 4.1 MMOL/L — SIGNIFICANT CHANGE UP (ref 3.5–5.3)
PROT SERPL-MCNC: 6.9 G/DL — SIGNIFICANT CHANGE UP (ref 6–8.3)
RBC # BLD: 3.91 M/UL — SIGNIFICANT CHANGE UP (ref 3.8–5.2)
RBC # FLD: 12.4 % — SIGNIFICANT CHANGE UP (ref 10.3–14.5)
SODIUM SERPL-SCNC: 141 MMOL/L — SIGNIFICANT CHANGE UP (ref 135–145)
WBC # BLD: 10.9 K/UL — HIGH (ref 3.8–10.5)
WBC # FLD AUTO: 10.9 K/UL — HIGH (ref 3.8–10.5)

## 2017-03-16 PROCEDURE — 61867 IMPLANT NEUROELECTRODE: CPT | Mod: 58,RT

## 2017-03-16 PROCEDURE — 70450 CT HEAD/BRAIN W/O DYE: CPT | Mod: 26,77

## 2017-03-16 PROCEDURE — 70450 CT HEAD/BRAIN W/O DYE: CPT | Mod: 26

## 2017-03-16 RX ORDER — PANTOPRAZOLE SODIUM 20 MG/1
40 TABLET, DELAYED RELEASE ORAL DAILY
Qty: 0 | Refills: 0 | Status: DISCONTINUED | OUTPATIENT
Start: 2017-03-16 | End: 2017-03-17

## 2017-03-16 RX ORDER — CEFAZOLIN SODIUM 1 G
1000 VIAL (EA) INJECTION EVERY 8 HOURS
Qty: 0 | Refills: 0 | Status: DISCONTINUED | OUTPATIENT
Start: 2017-03-16 | End: 2017-03-16

## 2017-03-16 RX ORDER — ONDANSETRON 8 MG/1
4 TABLET, FILM COATED ORAL ONCE
Qty: 0 | Refills: 0 | Status: DISCONTINUED | OUTPATIENT
Start: 2017-03-16 | End: 2017-03-17

## 2017-03-16 RX ORDER — ATORVASTATIN CALCIUM 80 MG/1
10 TABLET, FILM COATED ORAL AT BEDTIME
Qty: 0 | Refills: 0 | Status: DISCONTINUED | OUTPATIENT
Start: 2017-03-16 | End: 2017-03-17

## 2017-03-16 RX ORDER — ACETAMINOPHEN 500 MG
650 TABLET ORAL EVERY 6 HOURS
Qty: 0 | Refills: 0 | Status: DISCONTINUED | OUTPATIENT
Start: 2017-03-16 | End: 2017-03-17

## 2017-03-16 RX ORDER — CITALOPRAM 10 MG/1
20 TABLET, FILM COATED ORAL DAILY
Qty: 0 | Refills: 0 | Status: DISCONTINUED | OUTPATIENT
Start: 2017-03-16 | End: 2017-03-17

## 2017-03-16 RX ORDER — PROPRANOLOL HCL 160 MG
20 CAPSULE, EXTENDED RELEASE 24HR ORAL THREE TIMES A DAY
Qty: 0 | Refills: 0 | Status: DISCONTINUED | OUTPATIENT
Start: 2017-03-16 | End: 2017-03-17

## 2017-03-16 RX ORDER — CLONAZEPAM 1 MG
1 TABLET ORAL THREE TIMES A DAY
Qty: 0 | Refills: 0 | Status: DISCONTINUED | OUTPATIENT
Start: 2017-03-16 | End: 2017-03-17

## 2017-03-16 RX ORDER — ZOLPIDEM TARTRATE 10 MG/1
5 TABLET ORAL AT BEDTIME
Qty: 0 | Refills: 0 | Status: DISCONTINUED | OUTPATIENT
Start: 2017-03-16 | End: 2017-03-17

## 2017-03-16 RX ORDER — SENNA PLUS 8.6 MG/1
2 TABLET ORAL AT BEDTIME
Qty: 0 | Refills: 0 | Status: DISCONTINUED | OUTPATIENT
Start: 2017-03-16 | End: 2017-03-17

## 2017-03-16 RX ORDER — FENTANYL CITRATE 50 UG/ML
25 INJECTION INTRAVENOUS
Qty: 0 | Refills: 0 | Status: DISCONTINUED | OUTPATIENT
Start: 2017-03-16 | End: 2017-03-17

## 2017-03-16 RX ORDER — DEXTROSE MONOHYDRATE, SODIUM CHLORIDE, AND POTASSIUM CHLORIDE 50; .745; 4.5 G/1000ML; G/1000ML; G/1000ML
1000 INJECTION, SOLUTION INTRAVENOUS
Qty: 0 | Refills: 0 | Status: DISCONTINUED | OUTPATIENT
Start: 2017-03-16 | End: 2017-03-17

## 2017-03-16 RX ORDER — DOCUSATE SODIUM 100 MG
100 CAPSULE ORAL THREE TIMES A DAY
Qty: 0 | Refills: 0 | Status: DISCONTINUED | OUTPATIENT
Start: 2017-03-16 | End: 2017-03-17

## 2017-03-16 RX ORDER — SODIUM CHLORIDE 9 MG/ML
3 INJECTION INTRAMUSCULAR; INTRAVENOUS; SUBCUTANEOUS EVERY 8 HOURS
Qty: 0 | Refills: 0 | Status: DISCONTINUED | OUTPATIENT
Start: 2017-03-16 | End: 2017-03-16

## 2017-03-16 RX ADMIN — FENTANYL CITRATE 25 MICROGRAM(S): 50 INJECTION INTRAVENOUS at 18:15

## 2017-03-16 RX ADMIN — FENTANYL CITRATE 25 MICROGRAM(S): 50 INJECTION INTRAVENOUS at 19:15

## 2017-03-16 RX ADMIN — SODIUM CHLORIDE 3 MILLILITER(S): 9 INJECTION INTRAMUSCULAR; INTRAVENOUS; SUBCUTANEOUS at 06:13

## 2017-03-16 RX ADMIN — Medication 20 MILLIGRAM(S): at 21:48

## 2017-03-16 RX ADMIN — Medication 1 MILLIGRAM(S): at 21:47

## 2017-03-16 RX ADMIN — DEXTROSE MONOHYDRATE, SODIUM CHLORIDE, AND POTASSIUM CHLORIDE 75 MILLILITER(S): 50; .745; 4.5 INJECTION, SOLUTION INTRAVENOUS at 13:39

## 2017-03-16 RX ADMIN — FENTANYL CITRATE 25 MICROGRAM(S): 50 INJECTION INTRAVENOUS at 18:58

## 2017-03-16 RX ADMIN — FENTANYL CITRATE 25 MICROGRAM(S): 50 INJECTION INTRAVENOUS at 20:40

## 2017-03-16 RX ADMIN — FENTANYL CITRATE 25 MICROGRAM(S): 50 INJECTION INTRAVENOUS at 18:31

## 2017-03-16 RX ADMIN — ATORVASTATIN CALCIUM 10 MILLIGRAM(S): 80 TABLET, FILM COATED ORAL at 21:48

## 2017-03-16 RX ADMIN — Medication 100 MILLIGRAM(S): at 17:55

## 2017-03-17 ENCOUNTER — TRANSCRIPTION ENCOUNTER (OUTPATIENT)
Age: 62
End: 2017-03-17

## 2017-03-17 VITALS
OXYGEN SATURATION: 98 % | DIASTOLIC BLOOD PRESSURE: 64 MMHG | HEART RATE: 74 BPM | RESPIRATION RATE: 15 BRPM | SYSTOLIC BLOOD PRESSURE: 124 MMHG | TEMPERATURE: 99 F

## 2017-03-17 PROCEDURE — C1713: CPT

## 2017-03-17 PROCEDURE — 99261: CPT

## 2017-03-17 PROCEDURE — 85027 COMPLETE CBC AUTOMATED: CPT

## 2017-03-17 PROCEDURE — 80053 COMPREHEN METABOLIC PANEL: CPT

## 2017-03-17 PROCEDURE — C1778: CPT

## 2017-03-17 PROCEDURE — 70450 CT HEAD/BRAIN W/O DYE: CPT

## 2017-03-17 PROCEDURE — 76000 FLUOROSCOPY <1 HR PHYS/QHP: CPT

## 2017-03-17 RX ORDER — CITALOPRAM 10 MG/1
1 TABLET, FILM COATED ORAL
Qty: 0 | Refills: 0 | DISCHARGE
Start: 2017-03-17

## 2017-03-17 RX ORDER — CITALOPRAM 10 MG/1
1 TABLET, FILM COATED ORAL
Qty: 0 | Refills: 0 | COMMUNITY

## 2017-03-17 RX ORDER — OXYCODONE HYDROCHLORIDE 5 MG/1
1 TABLET ORAL
Qty: 12 | Refills: 0 | OUTPATIENT
Start: 2017-03-17 | End: 2017-03-20

## 2017-03-17 RX ORDER — ACETAMINOPHEN 500 MG
2 TABLET ORAL
Qty: 0 | Refills: 0 | DISCHARGE
Start: 2017-03-17

## 2017-03-17 RX ORDER — ACETAMINOPHEN 500 MG
1000 TABLET ORAL ONCE
Qty: 0 | Refills: 0 | Status: COMPLETED | OUTPATIENT
Start: 2017-03-17 | End: 2017-03-17

## 2017-03-17 RX ADMIN — Medication 20 MILLIGRAM(S): at 07:09

## 2017-03-17 RX ADMIN — FENTANYL CITRATE 25 MICROGRAM(S): 50 INJECTION INTRAVENOUS at 00:01

## 2017-03-17 RX ADMIN — Medication 1000 MILLIGRAM(S): at 03:00

## 2017-03-17 RX ADMIN — Medication 400 MILLIGRAM(S): at 02:30

## 2017-03-17 RX ADMIN — Medication 100 MILLIGRAM(S): at 00:30

## 2017-03-17 RX ADMIN — FENTANYL CITRATE 25 MICROGRAM(S): 50 INJECTION INTRAVENOUS at 00:22

## 2017-03-17 RX ADMIN — Medication 1 MILLIGRAM(S): at 07:09

## 2017-03-17 NOTE — DISCHARGE NOTE ADULT - PATIENT PORTAL LINK FT
“You can access the FollowHealth Patient Portal, offered by [facility], by registering with the following website: [email or web address]”

## 2017-03-17 NOTE — DISCHARGE NOTE ADULT - CARE PROVIDER_API CALL
Michael Redding), Neurosurgery  General  [address] [place] [id number]  Phone: [phone]  Fax: [phone]

## 2017-03-17 NOTE — DISCHARGE NOTE ADULT - CARE PLAN
Principal Discharge DX:	Essential tremor  Goal:	3/16 s/p Stage III right VIM DBS placement  Instructions for follow-up, activity and diet:	Make appt for follow up with Dr Redding in 7-10 days for staple removal. You may wash your hair on post op day #4 Monday. Pat incision dry after shower. Do not apply creams or lotions to incision. Continue current medication. You can take extra strength Tylenol 3x a day for pain if necessary.  Secondary Diagnosis:	S/P neurological surgery  Goal:	stable  Secondary Diagnosis:	Anxiety  Goal:	stable  Instructions for follow-up, activity and diet:	continue current treatment  Secondary Diagnosis:	Hyperlipidemia  Goal:	stable  Instructions for follow-up, activity and diet:	continue current medication. Make appt for follow up with your Primary Care Physician in 1 week.

## 2017-03-17 NOTE — DISCHARGE NOTE ADULT - ADDITIONAL INSTRUCTIONS
Any sign of fever, chills, lethargy or change in mental status, nausea or vomiting, severe headache or pain, or any drainage from your incision contact Dr Redding or go to ER

## 2017-03-17 NOTE — DISCHARGE NOTE ADULT - NS AS ACTIVITY OBS
Walking-Outdoors allowed/No Heavy lifting/straining/Walking-Indoors allowed/Stairs allowed/Do not drive or operate machinery

## 2017-03-17 NOTE — DISCHARGE NOTE ADULT - PLAN OF CARE
3/16 s/p Stage III right VIM DBS placement Make appt for follow up with Dr Redding in 7-10 days for staple removal. You may wash your hair on post op day #4 Monday. Pat incision dry after shower. Do not apply creams or lotions to incision. Continue current medication. You can take extra strength Tylenol 3x a day for pain if necessary. stable continue current treatment continue current medication. Make appt for follow up with your Primary Care Physician in 1 week.

## 2017-03-17 NOTE — DISCHARGE NOTE ADULT - HOSPITAL COURSE
60 yo female with essential tremor, who is intolerant of medication dosage required to adequately manage tremor, presents for left DBS IPG placement on 3/9/17.   Pt is s/p Stage III Right VIM Deep Brain Stimulator Placement. Post op CT head shows post op changes with extra axial air. Pt doing well, reports mild incisional pain managed with Tylenol. Pt is medically and neurologically stable for discharge to home.

## 2017-03-17 NOTE — DISCHARGE NOTE ADULT - MEDICATION SUMMARY - MEDICATIONS TO TAKE
I will START or STAY ON the medications listed below when I get home from the hospital:    calcium  -- 500 milligram(s) by mouth once a day  -- Indication: For supplement    acetaminophen 325 mg oral tablet  -- 2 tab(s) by mouth every 6 hours, As needed, Mild Pain (1 - 3)  -- Indication: For mild to moderate pain    Inderal 20 mg oral tablet  -- 1 tab(s) by mouth 3 times a day  -- Indication: For tremor    KlonoPIN 1 mg oral tablet  -- 1 tab(s) by mouth 3 times a day  -- Indication: For tremor    citalopram 20 mg oral tablet  -- 1 tab(s) by mouth once a day  -- Indication: For depression    Lipitor 10 mg oral tablet  -- 1 tab(s) by mouth once a day  -- Indication: For cholesterol    Halcion 0.25 mg oral tablet  -- 1 tab(s) by mouth once a day (at bedtime)  -- Indication: For insomnia    multivitamin  -- 1 tab(s) by mouth once a day  -- Indication: For vitamin     Vitamin D3 1000 intl units oral capsule  -- 1 cap(s) by mouth once a day  -- Indication: For vitamin

## 2017-03-23 ENCOUNTER — APPOINTMENT (OUTPATIENT)
Dept: NEUROSURGERY | Facility: CLINIC | Age: 62
End: 2017-03-23

## 2017-03-23 ENCOUNTER — OUTPATIENT (OUTPATIENT)
Dept: OUTPATIENT SERVICES | Facility: HOSPITAL | Age: 62
LOS: 1 days | Discharge: HOME | End: 2017-03-23
Payer: COMMERCIAL

## 2017-03-23 ENCOUNTER — TRANSCRIPTION ENCOUNTER (OUTPATIENT)
Age: 62
End: 2017-03-23

## 2017-03-23 ENCOUNTER — APPOINTMENT (OUTPATIENT)
Dept: NEUROSURGERY | Facility: HOSPITAL | Age: 62
End: 2017-03-23

## 2017-03-23 VITALS
RESPIRATION RATE: 18 BRPM | OXYGEN SATURATION: 99 % | DIASTOLIC BLOOD PRESSURE: 65 MMHG | WEIGHT: 169.98 LBS | SYSTOLIC BLOOD PRESSURE: 97 MMHG | TEMPERATURE: 98 F | HEART RATE: 56 BPM | HEIGHT: 65 IN

## 2017-03-23 VITALS
OXYGEN SATURATION: 99 % | TEMPERATURE: 98 F | DIASTOLIC BLOOD PRESSURE: 64 MMHG | RESPIRATION RATE: 18 BRPM | SYSTOLIC BLOOD PRESSURE: 121 MMHG | HEART RATE: 79 BPM

## 2017-03-23 DIAGNOSIS — Z90.12 ACQUIRED ABSENCE OF LEFT BREAST AND NIPPLE: Chronic | ICD-10-CM

## 2017-03-23 DIAGNOSIS — Z90.89 ACQUIRED ABSENCE OF OTHER ORGANS: Chronic | ICD-10-CM

## 2017-03-23 DIAGNOSIS — G25.0 ESSENTIAL TREMOR: ICD-10-CM

## 2017-03-23 PROCEDURE — C1787: CPT

## 2017-03-23 PROCEDURE — 61885 INSRT/REDO NEUROSTIM 1 ARRAY: CPT

## 2017-03-23 PROCEDURE — C1767: CPT

## 2017-03-23 PROCEDURE — C1883: CPT

## 2017-03-23 PROCEDURE — 61885 INSRT/REDO NEUROSTIM 1 ARRAY: CPT | Mod: 58,RT

## 2017-03-23 RX ORDER — SENNA PLUS 8.6 MG/1
2 TABLET ORAL AT BEDTIME
Qty: 0 | Refills: 0 | Status: DISCONTINUED | OUTPATIENT
Start: 2017-03-23 | End: 2017-04-07

## 2017-03-23 RX ORDER — DEXTROSE MONOHYDRATE, SODIUM CHLORIDE, AND POTASSIUM CHLORIDE 50; .745; 4.5 G/1000ML; G/1000ML; G/1000ML
1000 INJECTION, SOLUTION INTRAVENOUS
Qty: 0 | Refills: 0 | Status: DISCONTINUED | OUTPATIENT
Start: 2017-03-23 | End: 2017-04-07

## 2017-03-23 RX ORDER — CITALOPRAM 10 MG/1
20 TABLET, FILM COATED ORAL DAILY
Qty: 0 | Refills: 0 | Status: DISCONTINUED | OUTPATIENT
Start: 2017-03-23 | End: 2017-04-07

## 2017-03-23 RX ORDER — ONDANSETRON 8 MG/1
4 TABLET, FILM COATED ORAL ONCE
Qty: 0 | Refills: 0 | Status: DISCONTINUED | OUTPATIENT
Start: 2017-03-23 | End: 2017-03-23

## 2017-03-23 RX ORDER — PROPRANOLOL HCL 160 MG
20 CAPSULE, EXTENDED RELEASE 24HR ORAL THREE TIMES A DAY
Qty: 0 | Refills: 0 | Status: DISCONTINUED | OUTPATIENT
Start: 2017-03-23 | End: 2017-04-07

## 2017-03-23 RX ORDER — ONDANSETRON 8 MG/1
4 TABLET, FILM COATED ORAL EVERY 6 HOURS
Qty: 0 | Refills: 0 | Status: DISCONTINUED | OUTPATIENT
Start: 2017-03-23 | End: 2017-04-07

## 2017-03-23 RX ORDER — ZOLPIDEM TARTRATE 10 MG/1
5 TABLET ORAL AT BEDTIME
Qty: 0 | Refills: 0 | Status: DISCONTINUED | OUTPATIENT
Start: 2017-03-23 | End: 2017-03-23

## 2017-03-23 RX ORDER — DOCUSATE SODIUM 100 MG
100 CAPSULE ORAL THREE TIMES A DAY
Qty: 0 | Refills: 0 | Status: DISCONTINUED | OUTPATIENT
Start: 2017-03-23 | End: 2017-04-07

## 2017-03-23 RX ORDER — PANTOPRAZOLE SODIUM 20 MG/1
40 TABLET, DELAYED RELEASE ORAL DAILY
Qty: 0 | Refills: 0 | Status: DISCONTINUED | OUTPATIENT
Start: 2017-03-23 | End: 2017-04-07

## 2017-03-23 RX ORDER — ACETAMINOPHEN 500 MG
650 TABLET ORAL EVERY 6 HOURS
Qty: 0 | Refills: 0 | Status: DISCONTINUED | OUTPATIENT
Start: 2017-03-23 | End: 2017-04-07

## 2017-03-23 RX ORDER — SODIUM CHLORIDE 9 MG/ML
3 INJECTION INTRAMUSCULAR; INTRAVENOUS; SUBCUTANEOUS EVERY 8 HOURS
Qty: 0 | Refills: 0 | Status: DISCONTINUED | OUTPATIENT
Start: 2017-03-23 | End: 2017-03-23

## 2017-03-23 RX ORDER — CLONAZEPAM 1 MG
1 TABLET ORAL THREE TIMES A DAY
Qty: 0 | Refills: 0 | Status: COMPLETED | OUTPATIENT
Start: 2017-03-23 | End: 2017-03-30

## 2017-03-23 RX ORDER — ATORVASTATIN CALCIUM 80 MG/1
10 TABLET, FILM COATED ORAL AT BEDTIME
Qty: 0 | Refills: 0 | Status: DISCONTINUED | OUTPATIENT
Start: 2017-03-23 | End: 2017-04-07

## 2017-03-23 RX ORDER — HYDROMORPHONE HYDROCHLORIDE 2 MG/ML
0.5 INJECTION INTRAMUSCULAR; INTRAVENOUS; SUBCUTANEOUS
Qty: 0 | Refills: 0 | Status: DISCONTINUED | OUTPATIENT
Start: 2017-03-23 | End: 2017-03-23

## 2017-03-23 RX ADMIN — DEXTROSE MONOHYDRATE, SODIUM CHLORIDE, AND POTASSIUM CHLORIDE 75 MILLILITER(S): 50; .745; 4.5 INJECTION, SOLUTION INTRAVENOUS at 10:57

## 2017-03-23 NOTE — ASU DISCHARGE PLAN (ADULT/PEDIATRIC). - MEDICATION SUMMARY - MEDICATIONS TO TAKE
I will START or STAY ON the medications listed below when I get home from the hospital:    calcium  -- 500 milligram(s) by mouth once a day  -- Indication: For Essential tremor    acetaminophen 325 mg oral tablet  -- 2 tab(s) by mouth every 6 hours, As needed, Mild Pain (1 - 3)  -- Indication: For Essential tremor    Percocet 5/325 325 mg-5 mg oral tablet  -- 1 tab(s) by mouth every 6 hours MDD:4  -- Caution federal law prohibits the transfer of this drug to any person other  than the person for whom it was prescribed.  May cause drowsiness.  Alcohol may intensify this effect.  Use care when operating dangerous machinery.  This prescription cannot be refilled.  This product contains acetaminophen.  Do not use  with any other product containing acetaminophen to prevent possible liver damage.  Using more of this medication than prescribed may cause serious breathing problems.    -- Indication: For Essential tremor    Inderal 20 mg oral tablet  -- 1 tab(s) by mouth 3 times a day  -- Indication: For Essential tremor    KlonoPIN 1 mg oral tablet  -- 1 tab(s) by mouth 3 times a day  -- Indication: For Essential tremor    citalopram 20 mg oral tablet  -- 1 tab(s) by mouth once a day  -- Indication: For Essential tremor    Lipitor 10 mg oral tablet  -- 1 tab(s) by mouth once a day  -- Indication: For Essential tremor    Halcion 0.25 mg oral tablet  -- 1 tab(s) by mouth once a day (at bedtime)  -- Indication: For Essential tremor    Cleocin HCl 300 mg oral capsule  -- 1 cap(s) by mouth every 8 hours MDD:3  -- Finish all this medication unless otherwise directed by prescriber.  Medication should be taken with plenty of water.    -- Indication: For Essential tremor    multivitamin  -- 1 tab(s) by mouth once a day  -- Indication: For Essential tremor    Vitamin D3 1000 intl units oral capsule  -- 1 cap(s) by mouth once a day  -- Indication: For Essential tremor

## 2017-03-23 NOTE — H&P PST ADULT - HISTORY OF PRESENT ILLNESS
62 yo female with essential tremor, who is intolerant of medication dosage required to adequately manage tremor, presents for right DBS IPG placement on 3/23/17.

## 2017-03-30 ENCOUNTER — APPOINTMENT (OUTPATIENT)
Dept: NEUROSURGERY | Facility: CLINIC | Age: 62
End: 2017-03-30

## 2017-03-30 VITALS
BODY MASS INDEX: 27.98 KG/M2 | DIASTOLIC BLOOD PRESSURE: 60 MMHG | HEIGHT: 65.5 IN | WEIGHT: 170 LBS | HEART RATE: 76 BPM | SYSTOLIC BLOOD PRESSURE: 100 MMHG

## 2017-04-03 ENCOUNTER — APPOINTMENT (OUTPATIENT)
Dept: NEUROLOGY | Facility: CLINIC | Age: 62
End: 2017-04-03

## 2017-04-03 VITALS
HEART RATE: 95 BPM | WEIGHT: 170 LBS | SYSTOLIC BLOOD PRESSURE: 116 MMHG | DIASTOLIC BLOOD PRESSURE: 77 MMHG | BODY MASS INDEX: 27.98 KG/M2 | HEIGHT: 65.5 IN

## 2017-04-04 ENCOUNTER — APPOINTMENT (OUTPATIENT)
Dept: NEUROSURGERY | Facility: CLINIC | Age: 62
End: 2017-04-04

## 2017-04-04 VITALS
SYSTOLIC BLOOD PRESSURE: 106 MMHG | WEIGHT: 170 LBS | BODY MASS INDEX: 27.98 KG/M2 | DIASTOLIC BLOOD PRESSURE: 80 MMHG | HEART RATE: 92 BPM | HEIGHT: 65.5 IN

## 2017-04-04 DIAGNOSIS — G25.9 EXTRAPYRAMIDAL AND MOVEMENT DISORDER, UNSPECIFIED: ICD-10-CM

## 2017-04-06 PROBLEM — G25.9 MOVEMENT DISORDER: Status: ACTIVE | Noted: 2017-01-31

## 2017-04-13 PROCEDURE — 80048 BASIC METABOLIC PNL TOTAL CA: CPT

## 2017-04-13 PROCEDURE — 86850 RBC ANTIBODY SCREEN: CPT

## 2017-04-13 PROCEDURE — 85027 COMPLETE CBC AUTOMATED: CPT

## 2017-04-13 PROCEDURE — G0463: CPT

## 2017-04-13 PROCEDURE — 86900 BLOOD TYPING SEROLOGIC ABO: CPT

## 2017-04-13 PROCEDURE — 86901 BLOOD TYPING SEROLOGIC RH(D): CPT

## 2017-04-24 NOTE — PATIENT PROFILE ADULT. - FUNCTIONAL SCREEN CURRENT LEVEL: EATING, MLM
Visit Information Date & Time Provider Department Dept. Phone Encounter #  
 4/24/2017  8:30 AM Rachael Lai, 3 [facility] [phone] [id number] Upcoming Health Maintenance Date Due Pneumococcal 65+ Low/Medium Risk (2 of 2 - PPSV23) 10/13/2017 MEDICARE YEARLY EXAM 2/18/2018 GLAUCOMA SCREENING Q2Y 1/12/2019 BREAST CANCER SCRN MAMMOGRAM 4/10/2019 COLONOSCOPY 12/31/2024 DTaP/Tdap/Td series (2 - Td) 10/13/2026 Allergies as of 4/24/2017  Review Complete On: 4/24/2017 By: Rachael Lai MD  
 No Known Allergies Current Immunizations  Reviewed on 4/18/2016 No immunizations on file. Not reviewed this visit You Were Diagnosed With   
  
 Codes Comments Essential hypertension    -  Primary ICD-10-CM: I10 
ICD-9-CM: 401.9 Vitals BP Pulse Temp Resp Height(growth percentile) Weight(growth percentile) 138/88 (BP 1 Location: Left arm, BP Patient Position: Sitting) 89 97.1 °F (36.2 °C) (Oral) 17 5' 6\" (1.676 m) 209 lb (94.8 kg) SpO2 BMI OB Status Smoking Status 98% 33.73 kg/m2 Postmenopausal Former Smoker BMI and BSA Data Body Mass Index Body Surface Area  
 33.73 kg/m 2 2.1 m 2 Preferred Pharmacy Pharmacy Name Phone [facility] [address] [place] [phone] Your Updated Medication List  
  
   
This list is accurate as of: 4/24/17  9:23 AM.  Always use your most recent med list.  
  
  
  
  
 atorvastatin 20 mg tablet Commonly known as:  LIPITOR Take 1 Tab by mouth daily. Blood Pressure Monitor Kit Commonly known as:  BLOOD PRESSURE KIT For daily use. Dx: I99.8  
  
 coenzyme q10 10 mg Cap Take  by mouth.  
  
 ergocalciferol 50,000 unit capsule Commonly known as:  VITAMIN D2 Take 1 Cap by mouth every seven (7) days. lisinopril 5 mg tablet Commonly known as:  Elisa Gear  
 Take 1 Tab by mouth daily. Indications: hypertension  
  
 omeprazole 20 mg capsule Commonly known as:  PriLOSEC Take 1 Cap by mouth daily. Patient Instructions High Blood Pressure: Care Instructions Your Care Instructions If your blood pressure is usually above 140/90, you have high blood pressure, or hypertension. That means the top number is 140 or higher or the bottom number is 90 or higher, or both. Despite what a lot of people think, high blood pressure usually doesn't cause headaches or make you feel dizzy or lightheaded. It usually has no symptoms. But it does increase your risk for heart attack, stroke, and kidney or eye damage. The higher your blood pressure, the more your risk increases. Your doctor will give you a goal for your blood pressure. Your goal will be based on your health and your age. An example of a goal is to keep your blood pressure below 140/90. Lifestyle changes, such as eating healthy and being active, are always important to help lower blood pressure. You might also take medicine to reach your blood pressure goal. 
Follow-up care is a key part of your treatment and safety. Be sure to make and go to all appointments, and call your doctor if you are having problems. It's also a good idea to know your test results and keep a list of the medicines you take. How can you care for yourself at home? Medical treatment · If you stop taking your medicine, your blood pressure will go back up. You may take one or more types of medicine to lower your blood pressure. Be safe with medicines. Take your medicine exactly as prescribed. Call your doctor if you think you are having a problem with your medicine. · Talk to your doctor before you start taking aspirin every day. Aspirin can help certain people lower their risk of a heart attack or stroke. But taking aspirin isn't right for everyone, because it can cause serious bleeding. · See your doctor regularly. You may need to see the doctor more often at first or until your blood pressure comes down. · If you are taking blood pressure medicine, talk to your doctor before you take decongestants or anti-inflammatory medicine, such as ibuprofen. Some of these medicines can raise blood pressure. · Learn how to check your blood pressure at home. Lifestyle changes · Stay at a healthy weight. This is especially important if you put on weight around the waist. Losing even 10 pounds can help you lower your blood pressure. · If your doctor recommends it, get more exercise. Walking is a good choice. Bit by bit, increase the amount you walk every day. Try for at least 30 minutes on most days of the week. You also may want to swim, bike, or do other activities. · Avoid or limit alcohol. Talk to your doctor about whether you can drink any alcohol. · Try to limit how much sodium you eat to less than 2,300 milligrams (mg) a day. Your doctor may ask you to try to eat less than 1,500 mg a day. · Eat plenty of fruits (such as bananas and oranges), vegetables, legumes, whole grains, and low-fat dairy products. · Lower the amount of saturated fat in your diet. Saturated fat is found in animal products such as milk, cheese, and meat. Limiting these foods may help you lose weight and also lower your risk for heart disease. · Do not smoke. Smoking increases your risk for heart attack and stroke. If you need help quitting, talk to your doctor about stop-smoking programs and medicines. These can increase your chances of quitting for good. When should you call for help? Call 911 anytime you think you may need emergency care. This may mean having symptoms that suggest that your blood pressure is causing a serious heart or blood vessel problem. Your blood pressure may be over 180/110. For example, call 911 if: 
· You have symptoms of a heart attack. These may include: ¨ Chest pain or pressure, or a strange feeling in the chest. 
¨ Sweating. ¨ Shortness of breath. ¨ Nausea or vomiting. ¨ Pain, pressure, or a strange feeling in the back, neck, jaw, or upper belly or in one or both shoulders or arms. ¨ Lightheadedness or sudden weakness. ¨ A fast or irregular heartbeat. · You have symptoms of a stroke. These may include: 
¨ Sudden numbness, tingling, weakness, or loss of movement in your face, arm, or leg, especially on only one side of your body. ¨ Sudden vision changes. ¨ Sudden trouble speaking. ¨ Sudden confusion or trouble understanding simple statements. ¨ Sudden problems with walking or balance. ¨ A sudden, severe headache that is different from past headaches. · You have severe back or belly pain. Do not wait until your blood pressure comes down on its own. Get help right away. Call your doctor now or seek immediate care if: 
· Your blood pressure is much higher than normal (such as 180/110 or higher), but you don't have symptoms. · You think high blood pressure is causing symptoms, such as: ¨ Severe headache. ¨ Blurry vision. Watch closely for changes in your health, and be sure to contact your doctor if: 
· Your blood pressure measures 140/90 or higher at least 2 times. That means the top number is 140 or higher or the bottom number is 90 or higher, or both. · You think you may be having side effects from your blood pressure medicine. · Your blood pressure is usually normal, but it goes above normal at least 2 times. Where can you learn more? Go to http://stephanie-mariella.info/. Enter W608 in the search box to learn more about \"High Blood Pressure: Care Instructions. \" Current as of: August 8, 2016 Content Version: 11.2 © 7551-2503 [employer]. Care instructions adapted under license by [employer] (which disclaims liability or warranty for this information).  If you have questions about a medical condition or this instruction, always ask your healthcare professional. [place] [address] any warranty or liability for your use of this information. Introducing [facility]! Lexi Freitas introduces Site Lock patient portal. Now you can access parts of your medical record, email your doctor's office, and request medication refills online. 1. In your internet browser, go to [email or web address] 2. Click on the First Time User? Click Here link in the Sign In box. You will see the New Member Sign Up page. 3. Enter your Site Lock Access Code exactly as it appears below. You will not need to use this code after youve completed the sign-up process. If you do not sign up before the expiration date, you must request a new code. · Site Lock Access Code: Gloria Median Expires: 7/23/2017  9:21 AM 
 
4. Enter the last four digits of your Social Security Number (xxxx) and Date of Birth (mm/dd/yyyy) as indicated and click Submit. You will be taken to the next sign-up page. 5. Create a Site Lock ID. This will be your Site Lock login ID and cannot be changed, so think of one that is secure and easy to remember. 6. Create a Site Lock password. You can change your password at any time. 7. Enter your Password Reset Question and Answer. This can be used at a later time if you forget your password. 8. Enter your e-mail address. You will receive e-mail notification when new information is available in [address] 9. Click Sign Up. You can now view and download portions of your medical record. 10. Click the Download Summary menu link to download a portable copy of your medical information. If you have questions, please visit the Frequently Asked Questions section of the [employer] Lock website. Remember, Site Lock is NOT to be used for urgent needs. For medical emergencies, dial 911. Now available from your iPhone and Android! Please provide this summary of care documentation to your next provider. Your primary care clinician is listed as Teo 51. If you have any questions after today's visit, please call [phone]. (0) independent

## 2017-05-01 ENCOUNTER — APPOINTMENT (OUTPATIENT)
Dept: NEUROLOGY | Facility: CLINIC | Age: 62
End: 2017-05-01

## 2017-05-01 DIAGNOSIS — F41.9 ANXIETY DISORDER, UNSPECIFIED: ICD-10-CM

## 2017-07-11 ENCOUNTER — APPOINTMENT (OUTPATIENT)
Dept: NEUROLOGY | Facility: CLINIC | Age: 62
End: 2017-07-11

## 2017-07-11 DIAGNOSIS — G25.0 ESSENTIAL TREMOR: ICD-10-CM

## 2017-09-12 ENCOUNTER — APPOINTMENT (OUTPATIENT)
Dept: NEUROSURGERY | Facility: CLINIC | Age: 62
End: 2017-09-12
Payer: COMMERCIAL

## 2017-09-12 VITALS
DIASTOLIC BLOOD PRESSURE: 67 MMHG | SYSTOLIC BLOOD PRESSURE: 103 MMHG | WEIGHT: 170 LBS | HEIGHT: 65.5 IN | BODY MASS INDEX: 27.98 KG/M2 | HEART RATE: 76 BPM

## 2017-09-12 PROCEDURE — 99214 OFFICE O/P EST MOD 30 MIN: CPT

## 2018-01-09 ENCOUNTER — APPOINTMENT (OUTPATIENT)
Dept: NEUROLOGY | Facility: CLINIC | Age: 63
End: 2018-01-09
Payer: COMMERCIAL

## 2018-01-09 PROCEDURE — 99214 OFFICE O/P EST MOD 30 MIN: CPT | Mod: 25

## 2018-01-09 PROCEDURE — 95978: CPT

## 2018-01-09 RX ORDER — CITALOPRAM HYDROBROMIDE 20 MG/1
20 TABLET, FILM COATED ORAL
Qty: 60 | Refills: 0 | Status: ACTIVE | COMMUNITY
Start: 2017-11-28

## 2018-01-09 RX ORDER — DOXYCYCLINE HYCLATE 100 MG/1
100 CAPSULE ORAL
Qty: 60 | Refills: 0 | Status: ACTIVE | COMMUNITY
Start: 2017-11-27

## 2018-01-09 RX ORDER — TRIAZOLAM 0.25 MG/1
0.25 TABLET ORAL
Qty: 45 | Refills: 0 | Status: ACTIVE | COMMUNITY
Start: 2017-11-18

## 2018-07-10 ENCOUNTER — APPOINTMENT (OUTPATIENT)
Dept: NEUROLOGY | Facility: CLINIC | Age: 63
End: 2018-07-10
Payer: COMMERCIAL

## 2018-07-10 PROCEDURE — 95970 ALYS NPGT W/O PRGRMG: CPT

## 2018-07-10 PROCEDURE — 99214 OFFICE O/P EST MOD 30 MIN: CPT | Mod: 25

## 2019-01-18 ENCOUNTER — APPOINTMENT (OUTPATIENT)
Dept: NEUROLOGY | Facility: CLINIC | Age: 64
End: 2019-01-18

## 2019-02-19 PROBLEM — E78.5 HYPERLIPIDEMIA, UNSPECIFIED: Chronic | Status: ACTIVE | Noted: 2017-02-24

## 2019-02-19 PROBLEM — F41.9 ANXIETY DISORDER, UNSPECIFIED: Chronic | Status: ACTIVE | Noted: 2017-02-24

## 2019-06-18 ENCOUNTER — APPOINTMENT (OUTPATIENT)
Dept: NEUROLOGY | Facility: CLINIC | Age: 64
End: 2019-06-18
Payer: COMMERCIAL

## 2019-06-18 VITALS
DIASTOLIC BLOOD PRESSURE: 71 MMHG | HEIGHT: 65.5 IN | SYSTOLIC BLOOD PRESSURE: 117 MMHG | WEIGHT: 168 LBS | BODY MASS INDEX: 27.65 KG/M2 | HEART RATE: 87 BPM

## 2019-06-18 PROCEDURE — 99214 OFFICE O/P EST MOD 30 MIN: CPT

## 2019-06-18 NOTE — PROCEDURE
[FreeTextEntry1] : L VIM\par Activa SC, implanted 3/16/17 , 100% on\par  C+1- 2.1V PW 60, rate 130. \par Battery 2.98V\par \par R VIM\par Activa SC, implanted 3/23/17. 99% on\par C+ 1- 2.0V PW 60, rate 130\par Battery 2.97V\par \par Turned both off, no change in voice. Final setting same as initial \par

## 2019-06-18 NOTE — REVIEW OF SYSTEMS
[Feeling Poorly] : not feeling poorly [Confused or Disoriented] : no confusion [Poor Coordination] : good coordination [Difficulty Walking] : no difficulty walking [Depression] : no depression [Frequent Falls] : not falling

## 2019-06-18 NOTE — DISCUSSION/SUMMARY
[FreeTextEntry1] : 64 yo F w/ long-standing h/o ET, doing relatively well, but fatigue and anxiety and imbalance (all of which can relate to ET) affect work. \par \par 1. DBS done, no changes made today (as tremor per se is under control), no change in voice when we turned it off.  I don't have a good explanation for the voice, does not seem to be DBS related.\par 2. Continue anxiety medication, and follow with psychiatrist\par 3. RTC in 4 months\par

## 2019-06-18 NOTE — HISTORY OF PRESENT ILLNESS
[FreeTextEntry1] : 64 yo F w/ long-standing h/o ET presenting for follow up.  \par \par 1. DBS in March 2017, bilateral VIM, last adjustment in July 2017. Voice feels more "breathy".\par 2. Off inderal. Still on celexa 40 mg), more depressed, seeing a psychiatrist/psychologist, on  and clonazepam.\par 3. Continues to work, but a lot of stress there, my have to go on medical leave.\par 4. Had one fall off chair, sprained right wrist.

## 2019-06-18 NOTE — PHYSICAL EXAM
[General Appearance - Alert] : alert [General Appearance - In No Acute Distress] : in no acute distress [Oriented To Time, Place, And Person] : oriented to person, place, and time [General Appearance - Well Developed] : well developed [Impaired Insight] : insight and judgment were intact [Affect] : the affect was normal [Place] : oriented to place [Person] : oriented to person [Time] : oriented to time [Cranial Nerves Facial (VII)] : face symmetrical [Cranial Nerves Oculomotor (III)] : extraocular motion intact [Cranial Nerves Accessory (XI - Cranial And Spinal)] : head turning and shoulder shrug symmetric [Cranial Nerves Hypoglossal (XII)] : there was no tongue deviation with protrusion [Motor Handedness Right-Handed] : the patient is right hand dominant [Cranial Nerves Vestibulocochlear (VIII)] : hearing was intact bilaterally [FreeTextEntry4] : fluent speech, follows commands well [FreeTextEntry1] : No head tremor today. no voice tremor. Minimal bilateral tremor with spiral drawing . Minimal bilateral thumb tremor at rest. No wrist rigidity. No bradykinesia during hand , finger taps, and foot stomps. Able to rise from a chair without hands. Posture is erect. Gait is of normal pace, stride, and arm swing. No ataxia. Tandem walk mildy impaired

## 2019-08-27 ENCOUNTER — APPOINTMENT (OUTPATIENT)
Dept: NEUROLOGY | Facility: CLINIC | Age: 64
End: 2019-08-27

## 2019-10-15 ENCOUNTER — INPATIENT (INPATIENT)
Facility: HOSPITAL | Age: 64
LOS: 4 days | Discharge: HOME | DRG: 871 | End: 2019-10-20
Attending: STUDENT IN AN ORGANIZED HEALTH CARE EDUCATION/TRAINING PROGRAM | Admitting: HOSPITALIST
Payer: COMMERCIAL

## 2019-10-15 VITALS
OXYGEN SATURATION: 91 % | DIASTOLIC BLOOD PRESSURE: 65 MMHG | WEIGHT: 169.98 LBS | HEART RATE: 83 BPM | SYSTOLIC BLOOD PRESSURE: 101 MMHG | TEMPERATURE: 103 F | RESPIRATION RATE: 17 BRPM

## 2019-10-15 DIAGNOSIS — Z90.89 ACQUIRED ABSENCE OF OTHER ORGANS: Chronic | ICD-10-CM

## 2019-10-15 DIAGNOSIS — Z90.12 ACQUIRED ABSENCE OF LEFT BREAST AND NIPPLE: Chronic | ICD-10-CM

## 2019-10-15 LAB
ALBUMIN SERPL ELPH-MCNC: 3 G/DL — LOW (ref 3.3–5)
ALP SERPL-CCNC: 67 U/L — SIGNIFICANT CHANGE UP (ref 40–120)
ALT FLD-CCNC: 25 U/L — SIGNIFICANT CHANGE UP (ref 10–45)
ANION GAP SERPL CALC-SCNC: 10 MMOL/L — SIGNIFICANT CHANGE UP (ref 5–17)
APTT BLD: 25.9 SEC — LOW (ref 27.5–36.3)
AST SERPL-CCNC: 34 U/L — SIGNIFICANT CHANGE UP (ref 10–40)
B PERT DNA SPEC QL NAA+PROBE: SIGNIFICANT CHANGE UP
BASE EXCESS BLDV CALC-SCNC: 2.4 MMOL/L — HIGH (ref -2–2)
BASOPHILS # BLD AUTO: 0.01 K/UL — SIGNIFICANT CHANGE UP (ref 0–0.2)
BASOPHILS NFR BLD AUTO: 0.2 % — SIGNIFICANT CHANGE UP (ref 0–2)
BILIRUB SERPL-MCNC: 0.5 MG/DL — SIGNIFICANT CHANGE UP (ref 0.2–1.2)
BUN SERPL-MCNC: 16 MG/DL — SIGNIFICANT CHANGE UP (ref 7–23)
C PNEUM DNA SPEC QL NAA+PROBE: SIGNIFICANT CHANGE UP
CA-I SERPL-SCNC: 1.09 MMOL/L — LOW (ref 1.12–1.3)
CALCIUM SERPL-MCNC: 8.2 MG/DL — LOW (ref 8.4–10.5)
CHLORIDE BLDV-SCNC: 103 MMOL/L — SIGNIFICANT CHANGE UP (ref 96–108)
CHLORIDE SERPL-SCNC: 100 MMOL/L — SIGNIFICANT CHANGE UP (ref 96–108)
CO2 BLDV-SCNC: 30 MMOL/L — SIGNIFICANT CHANGE UP (ref 22–30)
CO2 SERPL-SCNC: 24 MMOL/L — SIGNIFICANT CHANGE UP (ref 22–31)
CREAT SERPL-MCNC: 1.06 MG/DL — SIGNIFICANT CHANGE UP (ref 0.5–1.3)
EOSINOPHIL # BLD AUTO: 0.08 K/UL — SIGNIFICANT CHANGE UP (ref 0–0.5)
EOSINOPHIL NFR BLD AUTO: 1.5 % — SIGNIFICANT CHANGE UP (ref 0–6)
FLUAV H1 2009 PAND RNA SPEC QL NAA+PROBE: SIGNIFICANT CHANGE UP
FLUAV H1 RNA SPEC QL NAA+PROBE: SIGNIFICANT CHANGE UP
FLUAV H3 RNA SPEC QL NAA+PROBE: SIGNIFICANT CHANGE UP
FLUAV SUBTYP SPEC NAA+PROBE: SIGNIFICANT CHANGE UP
FLUBV RNA SPEC QL NAA+PROBE: SIGNIFICANT CHANGE UP
GAS PNL BLDV: 130 MMOL/L — LOW (ref 135–145)
GAS PNL BLDV: SIGNIFICANT CHANGE UP
GAS PNL BLDV: SIGNIFICANT CHANGE UP
GLUCOSE BLDV-MCNC: 111 MG/DL — HIGH (ref 70–99)
GLUCOSE SERPL-MCNC: 121 MG/DL — HIGH (ref 70–99)
HADV DNA SPEC QL NAA+PROBE: SIGNIFICANT CHANGE UP
HCO3 BLDV-SCNC: 28 MMOL/L — SIGNIFICANT CHANGE UP (ref 21–29)
HCOV PNL SPEC NAA+PROBE: SIGNIFICANT CHANGE UP
HCT VFR BLD CALC: 29.9 % — LOW (ref 34.5–45)
HCT VFR BLDA CALC: 32 % — LOW (ref 39–50)
HGB BLD CALC-MCNC: 10.4 G/DL — LOW (ref 11.5–15.5)
HGB BLD-MCNC: 9.7 G/DL — LOW (ref 11.5–15.5)
HMPV RNA SPEC QL NAA+PROBE: SIGNIFICANT CHANGE UP
HPIV1 RNA SPEC QL NAA+PROBE: SIGNIFICANT CHANGE UP
HPIV2 RNA SPEC QL NAA+PROBE: SIGNIFICANT CHANGE UP
HPIV3 RNA SPEC QL NAA+PROBE: SIGNIFICANT CHANGE UP
HPIV4 RNA SPEC QL NAA+PROBE: SIGNIFICANT CHANGE UP
IMM GRANULOCYTES NFR BLD AUTO: 0.4 % — SIGNIFICANT CHANGE UP (ref 0–1.5)
INR BLD: 1.18 RATIO — HIGH (ref 0.88–1.16)
LACTATE BLDV-MCNC: 1.2 MMOL/L — SIGNIFICANT CHANGE UP (ref 0.7–2)
LYMPHOCYTES # BLD AUTO: 0.7 K/UL — LOW (ref 1–3.3)
LYMPHOCYTES # BLD AUTO: 13 % — SIGNIFICANT CHANGE UP (ref 13–44)
MCHC RBC-ENTMCNC: 30.7 PG — SIGNIFICANT CHANGE UP (ref 27–34)
MCHC RBC-ENTMCNC: 32.4 GM/DL — SIGNIFICANT CHANGE UP (ref 32–36)
MCV RBC AUTO: 94.6 FL — SIGNIFICANT CHANGE UP (ref 80–100)
MONOCYTES # BLD AUTO: 0.49 K/UL — SIGNIFICANT CHANGE UP (ref 0–0.9)
MONOCYTES NFR BLD AUTO: 9.1 % — SIGNIFICANT CHANGE UP (ref 2–14)
NEUTROPHILS # BLD AUTO: 4.1 K/UL — SIGNIFICANT CHANGE UP (ref 1.8–7.4)
NEUTROPHILS NFR BLD AUTO: 75.8 % — SIGNIFICANT CHANGE UP (ref 43–77)
NRBC # BLD: 0 /100 WBCS — SIGNIFICANT CHANGE UP (ref 0–0)
PCO2 BLDV: 52 MMHG — HIGH (ref 35–50)
PH BLDV: 7.36 — SIGNIFICANT CHANGE UP (ref 7.35–7.45)
PLATELET # BLD AUTO: 178 K/UL — SIGNIFICANT CHANGE UP (ref 150–400)
PO2 BLDV: 34 MMHG — SIGNIFICANT CHANGE UP (ref 25–45)
POTASSIUM BLDV-SCNC: 4.4 MMOL/L — SIGNIFICANT CHANGE UP (ref 3.5–5.3)
POTASSIUM SERPL-MCNC: 4.8 MMOL/L — SIGNIFICANT CHANGE UP (ref 3.5–5.3)
POTASSIUM SERPL-SCNC: 4.8 MMOL/L — SIGNIFICANT CHANGE UP (ref 3.5–5.3)
PROT SERPL-MCNC: 6.2 G/DL — SIGNIFICANT CHANGE UP (ref 6–8.3)
PROTHROM AB SERPL-ACNC: 13.5 SEC — HIGH (ref 10–12.9)
RAPID RVP RESULT: SIGNIFICANT CHANGE UP
RBC # BLD: 3.16 M/UL — LOW (ref 3.8–5.2)
RBC # FLD: 13.4 % — SIGNIFICANT CHANGE UP (ref 10.3–14.5)
RSV RNA SPEC QL NAA+PROBE: SIGNIFICANT CHANGE UP
RV+EV RNA SPEC QL NAA+PROBE: SIGNIFICANT CHANGE UP
SAO2 % BLDV: 62 % — LOW (ref 67–88)
SODIUM SERPL-SCNC: 134 MMOL/L — LOW (ref 135–145)
WBC # BLD: 5.4 K/UL — SIGNIFICANT CHANGE UP (ref 3.8–10.5)
WBC # FLD AUTO: 5.4 K/UL — SIGNIFICANT CHANGE UP (ref 3.8–10.5)

## 2019-10-15 PROCEDURE — 99291 CRITICAL CARE FIRST HOUR: CPT

## 2019-10-15 PROCEDURE — 71045 X-RAY EXAM CHEST 1 VIEW: CPT | Mod: 26

## 2019-10-15 RX ORDER — CEFTRIAXONE 500 MG/1
1000 INJECTION, POWDER, FOR SOLUTION INTRAMUSCULAR; INTRAVENOUS ONCE
Refills: 0 | Status: COMPLETED | OUTPATIENT
Start: 2019-10-15 | End: 2019-10-15

## 2019-10-15 RX ORDER — IPRATROPIUM/ALBUTEROL SULFATE 18-103MCG
3 AEROSOL WITH ADAPTER (GRAM) INHALATION ONCE
Refills: 0 | Status: COMPLETED | OUTPATIENT
Start: 2019-10-15 | End: 2019-10-15

## 2019-10-15 RX ORDER — NOREPINEPHRINE BITARTRATE/D5W 8 MG/250ML
0.05 PLASTIC BAG, INJECTION (ML) INTRAVENOUS
Qty: 8 | Refills: 0 | Status: DISCONTINUED | OUTPATIENT
Start: 2019-10-15 | End: 2019-10-16

## 2019-10-15 RX ORDER — SODIUM CHLORIDE 9 MG/ML
2400 INJECTION INTRAMUSCULAR; INTRAVENOUS; SUBCUTANEOUS ONCE
Refills: 0 | Status: COMPLETED | OUTPATIENT
Start: 2019-10-15 | End: 2019-10-15

## 2019-10-15 RX ORDER — ACETAMINOPHEN 500 MG
650 TABLET ORAL ONCE
Refills: 0 | Status: COMPLETED | OUTPATIENT
Start: 2019-10-15 | End: 2019-10-15

## 2019-10-15 RX ORDER — SODIUM CHLORIDE 9 MG/ML
1000 INJECTION INTRAMUSCULAR; INTRAVENOUS; SUBCUTANEOUS ONCE
Refills: 0 | Status: COMPLETED | OUTPATIENT
Start: 2019-10-15 | End: 2019-10-15

## 2019-10-15 RX ORDER — AZITHROMYCIN 500 MG/1
500 TABLET, FILM COATED ORAL ONCE
Refills: 0 | Status: COMPLETED | OUTPATIENT
Start: 2019-10-15 | End: 2019-10-15

## 2019-10-15 RX ADMIN — Medication 3 MILLILITER(S): at 20:09

## 2019-10-15 RX ADMIN — AZITHROMYCIN 500 MILLIGRAM(S): 500 TABLET, FILM COATED ORAL at 21:10

## 2019-10-15 RX ADMIN — Medication 650 MILLIGRAM(S): at 19:25

## 2019-10-15 RX ADMIN — Medication 3 MILLILITER(S): at 18:56

## 2019-10-15 RX ADMIN — CEFTRIAXONE 100 MILLIGRAM(S): 500 INJECTION, POWDER, FOR SOLUTION INTRAMUSCULAR; INTRAVENOUS at 20:09

## 2019-10-15 RX ADMIN — SODIUM CHLORIDE 2400 MILLILITER(S): 9 INJECTION INTRAMUSCULAR; INTRAVENOUS; SUBCUTANEOUS at 18:56

## 2019-10-15 RX ADMIN — SODIUM CHLORIDE 1000 MILLILITER(S): 9 INJECTION INTRAMUSCULAR; INTRAVENOUS; SUBCUTANEOUS at 22:15

## 2019-10-15 RX ADMIN — AZITHROMYCIN 250 MILLIGRAM(S): 500 TABLET, FILM COATED ORAL at 18:56

## 2019-10-15 RX ADMIN — Medication 650 MILLIGRAM(S): at 18:56

## 2019-10-15 RX ADMIN — CEFTRIAXONE 1000 MILLIGRAM(S): 500 INJECTION, POWDER, FOR SOLUTION INTRAMUSCULAR; INTRAVENOUS at 21:10

## 2019-10-15 RX ADMIN — SODIUM CHLORIDE 2400 MILLILITER(S): 9 INJECTION INTRAMUSCULAR; INTRAVENOUS; SUBCUTANEOUS at 21:10

## 2019-10-15 NOTE — ED PROVIDER NOTE - CARE PLAN
Principal Discharge DX:	Pneumonia of right middle lobe due to infectious organism Principal Discharge DX:	Pneumonia of right middle lobe due to infectious organism  Goal:	delerium, resolving

## 2019-10-15 NOTE — ED PROVIDER NOTE - PHYSICAL EXAMINATION
Vitals: tachy, hypoxic, tachypneic  Gen: pale, weak appearing, sick appearing  Head: NCAT  ENT: sclerae white, anicterus, moist mucous membranes.   CV: tachy, regular rate. Audible S1 and S2. No murmurs, rubs, gallops, S3, nor S4  Pulm: crackles rhonchi and wheezes throughout  Abd: soft, normoactive BS x4, NTND, no rebound, no guarding, no rashes  Musculoskeletal:  No peripheral edema  Skin: no lesions or scars noted  Neurologic: AAOx3  : no CVA tenderness

## 2019-10-15 NOTE — ED PROVIDER NOTE - NS_BEDUNITTYPES_ED_ALL_ED
Discussion/Summary   recomm decrease levothyroxine to 150mcg and recheck TSH in 2-3mo ; lipids mostly better        Verified Results  BASIC METABOLIC PNL [id number] 80:51HM VLADIMIRORLANDO CARDENAS     Test Name Result Flag Reference   SODIUM 141 mmol/L  135-145   POTASSIUM 4.3 mmol/L  3.4-5.1   CHLORIDE 107 mmol/L     CARBON DIOXIDE 27 mmol/L  21-32   ANION GAP 11 mmol/L  10-20   GLUCOSE 84 mg/dl  65-99   BUN 13 mg/dl  6-20   CREATININE 0.88 mg/dl  0.51-0.95   GFR EST.  AMER 83     eGFR 60 - 89 mL/min/1.73m2 = Mild decrease in kidney function. GFR EST. NONAFRI AMER 72     eGFR 60 - 89 mL/min/1.73m2 = Mild decrease in kidney function.    BUN/CREATININE RATIO 15  7-25   CALCIUM 9.1 mg/dl  8.4-10.2   FASTING STATUS UNK hrs       LIPID PNL 04Oct2018 12:01AM ORLANDO GONZALES     Test Name Result Flag Reference   FASTING STATUS UNK hrs     CHOLESTEROL 218 mg/dl H <200   Desirable            <200  Borderline High      200 to 239  High                 >=240   HDL CHOLESTEROL 50 mg/dl  >49   Low            <40  Borderline Low 40 to 49  Near Optimal   50 to 59  Optimal        >=60   TRIGLYCERIDES 168 mg/dl H <150   Normal                   <150  Borderline High          150 to 199  High                     200 to 499  Very High                >=500   LDL CHOLESTEROL (CALCULATED) 134 mg/dl H <130   OPTIMAL               <100  NEAR OPTIMAL          100-129  BORDERLINE HIGH       130-159  HIGH                  160-189  VERY HIGH             >=190   NON-HDL CHOLESTEROL 168 mg/dl     Therapeutic Target:  CHD and risk equivalents <130  Multiple risk factors    <160  0 to 1 risk factors      <190   CHOLESTEROL/HDL RATIO 4.4  <4.5     TSH 04Oct2018 12:01AM OR[facility]O GONZALES     Test Name Result Flag Reference   TSH 0.233 mcUnits/mL L 0.350-5.000
MEDICINE

## 2019-10-15 NOTE — ED PROVIDER NOTE - NS ED ROS FT
Gen: +fever +generalized weakenss  Eyes: No eye irritation or discharge  ENT: No earpain, congestion, sore throat  Resp: + cough, sob  Cardiovascular: No chest pain or palpitation  Gastroenteric: No nausea/vomiting, diarrhea, constipation  : No dysuria  MS: No joint or muscle pain  Skin: No rashes  Neuro: +AMS  Remainder negative, except as per the HPI

## 2019-10-15 NOTE — ED PROVIDER NOTE - OBJECTIVE STATEMENT
63yo F h/o essential tremors s/p nerve stimulator p/w AMS, sob, generalized weakness. code sepsis. pt started with cough x5d that was worsening. seen by pmd yesterday who did cxr and was told had pna and started on clarithromycin. + fever and confusion since yesterday. no sick contacts. found to be hypoxic 80s by ems and mildly hypotensive and given O2 and 500cc ivfs. no recent hosoptializations, sick contacts

## 2019-10-15 NOTE — ED PROVIDER NOTE - CLINICAL SUMMARY MEDICAL DECISION MAKING FREE TEXT BOX
63yo F h/o essential tremors s/p nerve stimulator p/w AMS, sob, generalized weakness. code sepsis. toxic appearing female, given lung exam, fever, cough, infectious source likely lung pathology. sepsis labs, abx, ivfs, xr admit.

## 2019-10-15 NOTE — ED ADULT NURSE NOTE - NSIMPLEMENTINTERV_GEN_ALL_ED
Implemented All Fall Risk Interventions:  [place] to call system. Call bell, personal items and telephone within reach. Instruct patient to call for assistance. Room bathroom lighting operational. Non-slip footwear when patient is off stretcher. Physically safe environment: no spills, clutter or unnecessary equipment. Stretcher in lowest position, wheels locked, appropriate side rails in place. Provide visual cue, wrist band, yellow gown, etc. Monitor gait and stability. Monitor for mental status changes and reorient to person, place, and time. Review medications for side effects contributing to fall risk. Reinforce activity limits and safety measures with patient and family.

## 2019-10-15 NOTE — ED ADULT NURSE REASSESSMENT NOTE - NS ED NURSE REASSESS COMMENT FT1
as per Dr Hui hold off on levophed due to MAP being above 60. pt awake to verbal stimuli. will continue to monitor.

## 2019-10-15 NOTE — ED ADULT NURSE NOTE - OBJECTIVE STATEMENT
65 y/o female BIB for cough x 5 days and  SOB, generalized weakness.  Pt with  h/o essential tremors s/p nerve stimulator.  Pt seen by PMD yesterday who did chest ray and was told pt had Pneumonia and started on clarithromycin.  (+) fever and confusion since yesterday.  Pt found to be hypoxic 80's by ems and mildly hypotensive and was given O2 and 500cc NS. 63 y/o female BIB for cough x 5 days and  SOB, generalized weakness.  Pt with  h/o essential tremors s/p nerve stimulator.  Pt seen by PMD yesterday who did chest ray and was told pt had Pneumonia and started on clarithromycin.  (+) fever and confusion since yesterday.  Pt found to be hypoxic 80's by ems and mildly hypotensive and was given O2 and 500cc NS.  (+) cough (+) SOB (+) wheeze.  HOB elevated.

## 2019-10-15 NOTE — ED PROVIDER NOTE - PROGRESS NOTE DETAILS
Jose GIRON MD PGY2: Patient reassessed after fluid boluses. Was still mildly hypptensive with MAPs in 60s but improved to 70s after one more liter of fluids. Patient's mental status improved and she had several comments about the democratic debate playing on the television. AAOx 3. Currently comfortable. dw icu, will see pt

## 2019-10-16 DIAGNOSIS — A41.9 SEPSIS, UNSPECIFIED ORGANISM: ICD-10-CM

## 2019-10-16 DIAGNOSIS — G93.41 METABOLIC ENCEPHALOPATHY: ICD-10-CM

## 2019-10-16 DIAGNOSIS — J18.1 LOBAR PNEUMONIA, UNSPECIFIED ORGANISM: ICD-10-CM

## 2019-10-16 DIAGNOSIS — Z96.89 PRESENCE OF OTHER SPECIFIED FUNCTIONAL IMPLANTS: Chronic | ICD-10-CM

## 2019-10-16 DIAGNOSIS — Z79.899 OTHER LONG TERM (CURRENT) DRUG THERAPY: ICD-10-CM

## 2019-10-16 DIAGNOSIS — E87.1 HYPO-OSMOLALITY AND HYPONATREMIA: ICD-10-CM

## 2019-10-16 DIAGNOSIS — E78.5 HYPERLIPIDEMIA, UNSPECIFIED: ICD-10-CM

## 2019-10-16 DIAGNOSIS — D64.9 ANEMIA, UNSPECIFIED: ICD-10-CM

## 2019-10-16 DIAGNOSIS — Z86.79 PERSONAL HISTORY OF OTHER DISEASES OF THE CIRCULATORY SYSTEM: Chronic | ICD-10-CM

## 2019-10-16 DIAGNOSIS — F41.9 ANXIETY DISORDER, UNSPECIFIED: ICD-10-CM

## 2019-10-16 DIAGNOSIS — Z29.9 ENCOUNTER FOR PROPHYLACTIC MEASURES, UNSPECIFIED: ICD-10-CM

## 2019-10-16 LAB
ALBUMIN SERPL ELPH-MCNC: 2.8 G/DL — LOW (ref 3.3–5)
ALP SERPL-CCNC: 65 U/L — SIGNIFICANT CHANGE UP (ref 40–120)
ALT FLD-CCNC: 27 U/L — SIGNIFICANT CHANGE UP (ref 10–45)
ANION GAP SERPL CALC-SCNC: 11 MMOL/L — SIGNIFICANT CHANGE UP (ref 5–17)
APPEARANCE UR: CLEAR — SIGNIFICANT CHANGE UP
AST SERPL-CCNC: 35 U/L — SIGNIFICANT CHANGE UP (ref 10–40)
BASOPHILS # BLD AUTO: 0.02 K/UL — SIGNIFICANT CHANGE UP (ref 0–0.2)
BASOPHILS NFR BLD AUTO: 0.3 % — SIGNIFICANT CHANGE UP (ref 0–2)
BILIRUB SERPL-MCNC: 0.4 MG/DL — SIGNIFICANT CHANGE UP (ref 0.2–1.2)
BILIRUB UR-MCNC: NEGATIVE — SIGNIFICANT CHANGE UP
BUN SERPL-MCNC: 14 MG/DL — SIGNIFICANT CHANGE UP (ref 7–23)
CALCIUM SERPL-MCNC: 8.3 MG/DL — LOW (ref 8.4–10.5)
CHLORIDE SERPL-SCNC: 104 MMOL/L — SIGNIFICANT CHANGE UP (ref 96–108)
CO2 SERPL-SCNC: 22 MMOL/L — SIGNIFICANT CHANGE UP (ref 22–31)
COLOR SPEC: SIGNIFICANT CHANGE UP
CREAT SERPL-MCNC: 0.8 MG/DL — SIGNIFICANT CHANGE UP (ref 0.5–1.3)
DIFF PNL FLD: NEGATIVE — SIGNIFICANT CHANGE UP
EOSINOPHIL # BLD AUTO: 0.1 K/UL — SIGNIFICANT CHANGE UP (ref 0–0.5)
EOSINOPHIL NFR BLD AUTO: 1.6 % — SIGNIFICANT CHANGE UP (ref 0–6)
GLUCOSE SERPL-MCNC: 102 MG/DL — HIGH (ref 70–99)
GLUCOSE UR QL: NEGATIVE — SIGNIFICANT CHANGE UP
HCT VFR BLD CALC: 27.8 % — LOW (ref 34.5–45)
HGB BLD-MCNC: 9.2 G/DL — LOW (ref 11.5–15.5)
IMM GRANULOCYTES NFR BLD AUTO: 0.6 % — SIGNIFICANT CHANGE UP (ref 0–1.5)
KETONES UR-MCNC: NEGATIVE — SIGNIFICANT CHANGE UP
LEUKOCYTE ESTERASE UR-ACNC: NEGATIVE — SIGNIFICANT CHANGE UP
LYMPHOCYTES # BLD AUTO: 0.54 K/UL — LOW (ref 1–3.3)
LYMPHOCYTES # BLD AUTO: 8.5 % — LOW (ref 13–44)
MAGNESIUM SERPL-MCNC: 1.8 MG/DL — SIGNIFICANT CHANGE UP (ref 1.6–2.6)
MCHC RBC-ENTMCNC: 31.3 PG — SIGNIFICANT CHANGE UP (ref 27–34)
MCHC RBC-ENTMCNC: 33.1 GM/DL — SIGNIFICANT CHANGE UP (ref 32–36)
MCV RBC AUTO: 94.6 FL — SIGNIFICANT CHANGE UP (ref 80–100)
MONOCYTES # BLD AUTO: 0.47 K/UL — SIGNIFICANT CHANGE UP (ref 0–0.9)
MONOCYTES NFR BLD AUTO: 7.4 % — SIGNIFICANT CHANGE UP (ref 2–14)
NEUTROPHILS # BLD AUTO: 5.22 K/UL — SIGNIFICANT CHANGE UP (ref 1.8–7.4)
NEUTROPHILS NFR BLD AUTO: 81.6 % — HIGH (ref 43–77)
NITRITE UR-MCNC: NEGATIVE — SIGNIFICANT CHANGE UP
NRBC # BLD: 0 /100 WBCS — SIGNIFICANT CHANGE UP (ref 0–0)
PH UR: 6.5 — SIGNIFICANT CHANGE UP (ref 5–8)
PHOSPHATE SERPL-MCNC: 2.4 MG/DL — LOW (ref 2.5–4.5)
PLATELET # BLD AUTO: 159 K/UL — SIGNIFICANT CHANGE UP (ref 150–400)
POTASSIUM SERPL-MCNC: 4.3 MMOL/L — SIGNIFICANT CHANGE UP (ref 3.5–5.3)
POTASSIUM SERPL-SCNC: 4.3 MMOL/L — SIGNIFICANT CHANGE UP (ref 3.5–5.3)
PROT SERPL-MCNC: 5.8 G/DL — LOW (ref 6–8.3)
PROT UR-MCNC: ABNORMAL
RBC # BLD: 2.94 M/UL — LOW (ref 3.8–5.2)
RBC # FLD: 13.7 % — SIGNIFICANT CHANGE UP (ref 10.3–14.5)
SODIUM SERPL-SCNC: 137 MMOL/L — SIGNIFICANT CHANGE UP (ref 135–145)
SP GR SPEC: 1.01 — LOW (ref 1.01–1.02)
UROBILINOGEN FLD QL: NEGATIVE — SIGNIFICANT CHANGE UP
WBC # BLD: 6.39 K/UL — SIGNIFICANT CHANGE UP (ref 3.8–10.5)
WBC # FLD AUTO: 6.39 K/UL — SIGNIFICANT CHANGE UP (ref 3.8–10.5)

## 2019-10-16 PROCEDURE — 99223 1ST HOSP IP/OBS HIGH 75: CPT | Mod: GC

## 2019-10-16 PROCEDURE — 12345: CPT | Mod: NC,GC

## 2019-10-16 PROCEDURE — 99285 EMERGENCY DEPT VISIT HI MDM: CPT | Mod: GC

## 2019-10-16 RX ORDER — ATORVASTATIN CALCIUM 80 MG/1
10 TABLET, FILM COATED ORAL AT BEDTIME
Refills: 0 | Status: DISCONTINUED | OUTPATIENT
Start: 2019-10-16 | End: 2019-10-20

## 2019-10-16 RX ORDER — INFLUENZA VIRUS VACCINE 15; 15; 15; 15 UG/.5ML; UG/.5ML; UG/.5ML; UG/.5ML
0.5 SUSPENSION INTRAMUSCULAR ONCE
Refills: 0 | Status: DISCONTINUED | OUTPATIENT
Start: 2019-10-16 | End: 2019-10-20

## 2019-10-16 RX ORDER — IPRATROPIUM/ALBUTEROL SULFATE 18-103MCG
3 AEROSOL WITH ADAPTER (GRAM) INHALATION EVERY 4 HOURS
Refills: 0 | Status: DISCONTINUED | OUTPATIENT
Start: 2019-10-16 | End: 2019-10-19

## 2019-10-16 RX ORDER — ACETAMINOPHEN 500 MG
650 TABLET ORAL EVERY 6 HOURS
Refills: 0 | Status: DISCONTINUED | OUTPATIENT
Start: 2019-10-16 | End: 2019-10-20

## 2019-10-16 RX ORDER — CLONAZEPAM 1 MG
1 TABLET ORAL
Qty: 0 | Refills: 0 | DISCHARGE

## 2019-10-16 RX ORDER — MIDODRINE HYDROCHLORIDE 2.5 MG/1
10 TABLET ORAL EVERY 8 HOURS
Refills: 0 | Status: DISCONTINUED | OUTPATIENT
Start: 2019-10-16 | End: 2019-10-17

## 2019-10-16 RX ORDER — AZITHROMYCIN 500 MG/1
500 TABLET, FILM COATED ORAL EVERY 24 HOURS
Refills: 0 | Status: DISCONTINUED | OUTPATIENT
Start: 2019-10-16 | End: 2019-10-18

## 2019-10-16 RX ORDER — ENOXAPARIN SODIUM 100 MG/ML
40 INJECTION SUBCUTANEOUS DAILY
Refills: 0 | Status: DISCONTINUED | OUTPATIENT
Start: 2019-10-16 | End: 2019-10-20

## 2019-10-16 RX ORDER — FUROSEMIDE 40 MG
40 TABLET ORAL ONCE
Refills: 0 | Status: COMPLETED | OUTPATIENT
Start: 2019-10-16 | End: 2019-10-16

## 2019-10-16 RX ORDER — PANTOPRAZOLE SODIUM 20 MG/1
40 TABLET, DELAYED RELEASE ORAL
Refills: 0 | Status: DISCONTINUED | OUTPATIENT
Start: 2019-10-16 | End: 2019-10-20

## 2019-10-16 RX ORDER — ALBUTEROL 90 UG/1
1 AEROSOL, METERED ORAL EVERY 4 HOURS
Refills: 0 | Status: DISCONTINUED | OUTPATIENT
Start: 2019-10-16 | End: 2019-10-20

## 2019-10-16 RX ORDER — TIOTROPIUM BROMIDE 18 UG/1
1 CAPSULE ORAL; RESPIRATORY (INHALATION) DAILY
Refills: 0 | Status: DISCONTINUED | OUTPATIENT
Start: 2019-10-16 | End: 2019-10-20

## 2019-10-16 RX ORDER — ATORVASTATIN CALCIUM 80 MG/1
1 TABLET, FILM COATED ORAL
Qty: 0 | Refills: 0 | DISCHARGE

## 2019-10-16 RX ORDER — CHOLECALCIFEROL (VITAMIN D3) 125 MCG
1 CAPSULE ORAL
Qty: 0 | Refills: 0 | DISCHARGE

## 2019-10-16 RX ORDER — SODIUM,POTASSIUM PHOSPHATES 278-250MG
1 POWDER IN PACKET (EA) ORAL ONCE
Refills: 0 | Status: COMPLETED | OUTPATIENT
Start: 2019-10-16 | End: 2019-10-16

## 2019-10-16 RX ORDER — CLONAZEPAM 1 MG
1 TABLET ORAL
Refills: 0 | Status: DISCONTINUED | OUTPATIENT
Start: 2019-10-16 | End: 2019-10-20

## 2019-10-16 RX ORDER — CITALOPRAM 10 MG/1
40 TABLET, FILM COATED ORAL
Refills: 0 | Status: DISCONTINUED | OUTPATIENT
Start: 2019-10-16 | End: 2019-10-20

## 2019-10-16 RX ORDER — CEFTRIAXONE 500 MG/1
1000 INJECTION, POWDER, FOR SOLUTION INTRAMUSCULAR; INTRAVENOUS EVERY 24 HOURS
Refills: 0 | Status: DISCONTINUED | OUTPATIENT
Start: 2019-10-16 | End: 2019-10-20

## 2019-10-16 RX ADMIN — Medication 3 MILLILITER(S): at 17:03

## 2019-10-16 RX ADMIN — Medication 100 MILLIGRAM(S): at 15:29

## 2019-10-16 RX ADMIN — AZITHROMYCIN 250 MILLIGRAM(S): 500 TABLET, FILM COATED ORAL at 21:48

## 2019-10-16 RX ADMIN — SODIUM CHLORIDE 1000 MILLILITER(S): 9 INJECTION INTRAMUSCULAR; INTRAVENOUS; SUBCUTANEOUS at 00:00

## 2019-10-16 RX ADMIN — Medication 1 MILLIGRAM(S): at 09:29

## 2019-10-16 RX ADMIN — CEFTRIAXONE 100 MILLIGRAM(S): 500 INJECTION, POWDER, FOR SOLUTION INTRAMUSCULAR; INTRAVENOUS at 21:17

## 2019-10-16 RX ADMIN — Medication 100 MILLIGRAM(S): at 04:53

## 2019-10-16 RX ADMIN — Medication 650 MILLIGRAM(S): at 09:35

## 2019-10-16 RX ADMIN — Medication 650 MILLIGRAM(S): at 08:07

## 2019-10-16 RX ADMIN — CITALOPRAM 40 MILLIGRAM(S): 10 TABLET, FILM COATED ORAL at 08:25

## 2019-10-16 RX ADMIN — Medication 100 MILLIGRAM(S): at 21:16

## 2019-10-16 RX ADMIN — MIDODRINE HYDROCHLORIDE 10 MILLIGRAM(S): 2.5 TABLET ORAL at 02:37

## 2019-10-16 RX ADMIN — Medication 200 MILLIGRAM(S): at 08:25

## 2019-10-16 RX ADMIN — MIDODRINE HYDROCHLORIDE 10 MILLIGRAM(S): 2.5 TABLET ORAL at 14:01

## 2019-10-16 RX ADMIN — ENOXAPARIN SODIUM 40 MILLIGRAM(S): 100 INJECTION SUBCUTANEOUS at 13:29

## 2019-10-16 RX ADMIN — Medication 200 MILLIGRAM(S): at 21:56

## 2019-10-16 RX ADMIN — Medication 3 MILLILITER(S): at 09:29

## 2019-10-16 RX ADMIN — Medication 3 MILLILITER(S): at 13:01

## 2019-10-16 RX ADMIN — Medication 200 MILLIGRAM(S): at 14:06

## 2019-10-16 RX ADMIN — MIDODRINE HYDROCHLORIDE 10 MILLIGRAM(S): 2.5 TABLET ORAL at 21:25

## 2019-10-16 RX ADMIN — Medication 1 PACKET(S): at 13:59

## 2019-10-16 RX ADMIN — Medication 3 MILLILITER(S): at 21:16

## 2019-10-16 RX ADMIN — Medication 40 MILLIGRAM(S): at 10:41

## 2019-10-16 RX ADMIN — ATORVASTATIN CALCIUM 10 MILLIGRAM(S): 80 TABLET, FILM COATED ORAL at 21:16

## 2019-10-16 NOTE — H&P ADULT - NSHPREVIEWOFSYSTEMS_GEN_ALL_CORE
REVIEW OF SYSTEMS:  CONSTITUTIONAL: SEE HPI  EYES: No eye pain, visual disturbances, or discharge  ENT:  No difficulty hearing, tinnitus, vertigo; No sinus or throat pain  NECK: No pain or stiffness  RESPIRATORY: SEE HPI  CARDIOVASCULAR: SEE HPI  GASTROINTESTINAL: No abdominal or epigastric pain. No nausea, vomiting, or hematemesis; No diarrhea or constipation. No melena or hematochezia.  GENITOURINARY: No dysuria, frequency, hematuria, or incontinence  NEUROLOGICAL: SEE HPI  SKIN: No itching, burning, rashes, or lesions   LYMPH NODES: No enlarged glands  ENDOCRINE: No hot or cold intolerance; No hair loss  MUSCULOSKELETAL: No joint pain or swelling; No muscle, back, or extremity pain  PSYCHIATRIC: No depression, anxiety, mood swings, or difficulty sleeping  HEME/LYMPH: No easy bruising, or bleeding gums  ALLERGY AND IMMUNOLOGIC: No hives or eczema

## 2019-10-16 NOTE — CONSULT NOTE ADULT - SUBJECTIVE AND OBJECTIVE BOX
CHIEF COMPLAINT:     HPI:  63 yo F with a PMHx of essential tremors s/p nerve stimulator and HLD who p/w acute onset of AMSx1d, dypsnea x5d, and generalized malaise with a dry cough x2 weeks. Denies sore throat. States she has chronic rhinorrhea and congestion at baseline. Feels like she has some congestion in her chest. No CP or abdominal pain. No N/V/D. +subjective fever and chills. No sick contacts. Recently, went on a trip to [place] and stayed in a house.     In the ED, she was febrile to 103.4 and her BP became labile 70s-100s/40s-60s. S/p 1L NS x1, 2.4L NS x1, 650mg acetaminophen x1, 1g ceftriaxone x1, 500mg azithro x1, albuterol neb x2.     MICU consulted to jos for septic shock.     FAMILY HISTORY:      SOCIAL HISTORY:  Smoking: never  EtOH Use: once per month recreationally  Marital Status: , lives with her   Occupation: retired  Recent Travel: to [place]  Country of Birth:   Advance Directives:    Allergies    penicillin (Rash)  sulfa drugs (Rash)    Intolerances        Home Medications:  acetaminophen 325 mg oral tablet: 2 tab(s) orally every 6 hours, As needed, Mild Pain (1 - 3) (23 Mar 2017 06:24)  calcium: 500 milligram(s) orally once a day (23 Mar 2017 06:24)  citalopram 20 mg oral tablet: 1 tab(s) orally once a day (23 Mar 2017 06:24)  Halcion 0.25 mg oral tablet: 1 tab(s) orally once a day (at bedtime) (23 Mar 2017 06:24)  Inderal 20 mg oral tablet: 1 tab(s) orally 3 times a day (23 Mar 2017 06:24)  KlonoPIN 1 mg oral tablet: 1 tab(s) orally 3 times a day (23 Mar 2017 06:24)  Lipitor 10 mg oral tablet: 1 tab(s) orally once a day (23 Mar 2017 06:24)  multivitamin: 1 tab(s) orally once a day (23 Mar 2017 06:24)  Vitamin D3 1000 intl units oral capsule: 1 cap(s) orally once a day (23 Mar 2017 06:24)      REVIEW OF SYSTEMS:    CONSTITUTIONAL: + weakness, fevers and chills  EYES/ENT: No visual changes;  No vertigo or throat pain   NECK: No pain or stiffness  RESPIRATORY: Chest congestion. Cough.   CARDIOVASCULAR: No chest pain or palpitations  GASTROINTESTINAL: No abdominal or epigastric pain. No nausea, vomiting, or hematemesis; No diarrhea or constipation. No melena or hematochezia.  GENITOURINARY: No dysuria, frequency or hematuria  NEUROLOGICAL: No numbness or weakness  SKIN: No itching, burning, rashes, or lesions   All other review of systems is negative unless indicated above.      OBJECTIVE:  ICU Vital Signs Last 24 Hrs  T(C): 36.8 (16 Oct 2019 00:15), Max: 39.7 (15 Oct 2019 18:40)  T(F): 98.3 (16 Oct 2019 00:15), Max: 103.4 (15 Oct 2019 18:40)  HR: 70 (16 Oct 2019 02:36) (70 - 98)  BP: 111/65 (16 Oct 2019 02:36) (74/58 - 113/73)  BP(mean): 79 (16 Oct 2019 02:36) (57 - 79)  ABP: --  ABP(mean): --  RR: 16 (16 Oct 2019 02:36) (16 - 20)  SpO2: 97% (16 Oct 2019 02:36) (91% - 100%)        CAPILLARY BLOOD GLUCOSE      PHYSICAL EXAM:  GENERAL: NAD, well-developed  HEAD:  Atraumatic, Normocephalic  EYES: EOMI, PERRLA, conjunctiva and sclera clear  NECK: Supple, No JVD  CHEST/LUNG: Diffuse expiratory wheeze throughout  HEART: NL s1s2, regular rate and rhythm; No murmurs, rubs, or gallops  ABDOMEN: Soft, Nontender, Nondistended; Bowel sounds present  EXTREMITIES:  2+ Peripheral Pulses, No clubbing, cyanosis, or edema  PSYCH: AAOx3  NEUROLOGY: non-focal  SKIN: No rashes or lesions      HOSPITAL MEDICATIONS:  MEDICATIONS  (STANDING):  midodrine 10 milliGRAM(s) Oral every 8 hours    MEDICATIONS  (PRN):      LABS:                        9.7    5.40  )-----------( 178      ( 15 Oct 2019 19:02 )             29.9     10-15    134<L>  |  100  |  16  ----------------------------<  121<H>  4.8   |  24  |  1.06    Ca    8.2<L>      15 Oct 2019 19:02    TPro  6.2  /  Alb  3.0<L>  /  TBili  0.5  /  DBili  x   /  AST  34  /  ALT  25  /  AlkPhos  67  10-15    PT/INR - ( 15 Oct 2019 19:02 )   PT: 13.5 sec;   INR: 1.18 ratio         PTT - ( 15 Oct 2019 19:02 )  PTT:25.9 sec  Urinalysis Basic - ( 16 Oct 2019 01:17 )    Color: Light Yellow / Appearance: Clear / S.009 / pH: x  Gluc: x / Ketone: Negative  / Bili: Negative / Urobili: Negative   Blood: x / Protein: 30 mg/dL / Nitrite: Negative   Leuk Esterase: Negative / RBC: 7 /hpf / WBC 3 /HPF   Sq Epi: x / Non Sq Epi: 1 /hpf / Bacteria: Negative        Venous Blood Gas:  10-15 @ 19:02  7.36/52/34/28/62  VBG Lactate: 1.2      MICROBIOLOGY:     RADIOLOGY:  [ x] Reviewed and interpreted by me      EKG: CHIEF COMPLAINT: dyspnea, weakness, malaise, cough    HPI:  63 yo F with a PMHx of essential tremors s/p nerve stimulator and HLD who p/w acute onset of AMSx1d, dypsnea x5d, and generalized malaise with a dry cough x2 weeks. Denies sore throat. States she has chronic rhinorrhea and congestion at baseline. Feels like she has some congestion in her chest. No CP or abdominal pain. No N/V/D. +subjective fever and chills. No sick contacts. Recently, went on a trip to [place] and stayed in a house.     In the ED, she was febrile to 103.4 and her BP became labile 70s-100s/40s-60s. S/p 1L NS x1, 2.4L NS x1, 650mg acetaminophen x1, 1g ceftriaxone x1, 500mg azithro x1, albuterol neb x2.     MICU consulted to jos for septic shock.     FAMILY HISTORY: no pertinent hx in first degree relatives    SOCIAL HISTORY:  Smoking: never  EtOH Use: once per month recreationally  Marital Status: , lives with her   Occupation: retired  Recent Travel: to [place]  Country of Birth:   Advance Directives:    Allergies    penicillin (Rash)  sulfa drugs (Rash)    Home Medications:  acetaminophen 325 mg oral tablet: 2 tab(s) orally every 6 hours, As needed, Mild Pain (1 - 3) (23 Mar 2017 06:24)  calcium: 500 milligram(s) orally once a day (23 Mar 2017 06:24)  citalopram 20 mg oral tablet: 1 tab(s) orally once a day (23 Mar 2017 06:24)  Halcion 0.25 mg oral tablet: 1 tab(s) orally once a day (at bedtime) (23 Mar 2017 06:24)  Inderal 20 mg oral tablet: 1 tab(s) orally 3 times a day (23 Mar 2017 06:24)  KlonoPIN 1 mg oral tablet: 1 tab(s) orally 3 times a day (23 Mar 2017 06:24)  Lipitor 10 mg oral tablet: 1 tab(s) orally once a day (23 Mar 2017 06:24)  multivitamin: 1 tab(s) orally once a day (23 Mar 2017 06:24)  Vitamin D3 1000 intl units oral capsule: 1 cap(s) orally once a day (23 Mar 2017 06:24)      REVIEW OF SYSTEMS:    CONSTITUTIONAL: + weakness, fevers and chills  EYES/ENT: No visual changes;  No vertigo or throat pain   NECK: No pain or stiffness  RESPIRATORY: Chest congestion. Cough.   CARDIOVASCULAR: No chest pain or palpitations  GASTROINTESTINAL: No abdominal or epigastric pain. No nausea, vomiting, or hematemesis; No diarrhea or constipation. No melena or hematochezia.  GENITOURINARY: No dysuria, frequency or hematuria  NEUROLOGICAL: No numbness or weakness  SKIN: No itching, burning, rashes, or lesions   All other review of systems is negative unless indicated above.      OBJECTIVE:  ICU Vital Signs Last 24 Hrs  T(C): 36.8 (16 Oct 2019 00:15), Max: 39.7 (15 Oct 2019 18:40)  T(F): 98.3 (16 Oct 2019 00:15), Max: 103.4 (15 Oct 2019 18:40)  HR: 70 (16 Oct 2019 02:36) (70 - 98)  BP: 111/65 (16 Oct 2019 02:36) (74/58 - 113/73)  BP(mean): 79 (16 Oct 2019 02:36) (57 - 79)  RR: 16 (16 Oct 2019 02:36) (16 - 20)  SpO2: 97% (16 Oct 2019 02:36) (91% - 100%)      PHYSICAL EXAM:  GENERAL: NAD, well-developed  HEAD:  Atraumatic, Normocephalic  EYES: EOMI, PERRLA, conjunctiva and sclera clear  NECK: Supple, No JVD  CHEST/LUNG: faint end- expiratory wheeze throughout  HEART: NL s1s2, regular rate and rhythm; No murmurs, rubs, or gallops  ABDOMEN: Soft, Nontender, Nondistended; Bowel sounds present  EXTREMITIES:  2+ Peripheral Pulses, No clubbing, cyanosis, or edema  PSYCH: AAOx3  NEUROLOGY: non-focal  SKIN: No rashes or lesions      HOSPITAL MEDICATIONS:  MEDICATIONS  (STANDING):  midodrine 10 milliGRAM(s) Oral every 8 hours    MEDICATIONS  (PRN):      LABS:                        9.7    5.40  )-----------( 178      ( 15 Oct 2019 19:02 )             29.9     10-15    134<L>  |  100  |  16  ----------------------------<  121<H>  4.8   |  24  |  1.06    Ca    8.2<L>      15 Oct 2019 19:02    TPro  6.2  /  Alb  3.0<L>  /  TBili  0.5  /  DBili  x   /  AST  34  /  ALT  25  /  AlkPhos  67  10-15    PT/INR - ( 15 Oct 2019 19:02 )   PT: 13.5 sec;   INR: 1.18 ratio         PTT - ( 15 Oct 2019 19:02 )  PTT:25.9 sec  Urinalysis Basic - ( 16 Oct 2019 01:17 )    Color: Light Yellow / Appearance: Clear / S.009 / pH: x  Gluc: x / Ketone: Negative  / Bili: Negative / Urobili: Negative   Blood: x / Protein: 30 mg/dL / Nitrite: Negative   Leuk Esterase: Negative / RBC: 7 /hpf / WBC 3 /HPF   Sq Epi: x / Non Sq Epi: 1 /hpf / Bacteria: Negative    Venous Blood Gas:  10-15 @ 19:02  7.36/52/34/28/62  VBG Lactate: 1.2      MICROBIOLOGY: cultures pending    RADIOLOGY:  [ x] Reviewed and interpreted by me    CXR: L mid-lung infiltrate

## 2019-10-16 NOTE — ED ADULT NURSE REASSESSMENT NOTE - NS ED NURSE REASSESS COMMENT FT1
Pt urinated via bedpan. Clean diaper, clean avi, and clean applied to patient. Pt perineal cleansed. Oral care provided to patient. PT reports feeling improved. Awaiting MICU consultation recommendations. Pt placed in position of comfort. Pt educated on call bell system and provided call bell. Bed in lowest position, wheels locked, appropriate side rails raised. Pt denies needs at this time.

## 2019-10-16 NOTE — H&P ADULT - PROBLEM SELECTOR PLAN 6
- continue with lipitor 10 mg Admitted with hgb 9.7. Hgb was 13,0 in February 2017.  - may be 2/2 chronic blood loss anemia vs iron deficiency  - will send FOBT Admitted with Hgb 9.7. Hgb was 13.0 in February 2017.  - may be 2/2 chronic blood loss anemia vs iron deficiency  - will send FOBT

## 2019-10-16 NOTE — H&P ADULT - PROBLEM SELECTOR PLAN 5
- continue with celexa and klonopin  [facility] Reference #: [id number] Patient report discrepancies with klonopin dosing based on [facility] records.   - clarify with  in AM  - will treat with prn dosing of 1 mg q12h

## 2019-10-16 NOTE — CHART NOTE - NSCHARTNOTEFT_GEN_A_CORE
Dia Manny | Reference #: [id number]    Others' Prescriptions  Patient Name:	Cristiane Infante	YOB: 1955  Address:	 [place] [address]	Sex:	Female  Rx Written	Rx Dispensed	Drug	Quantity	Days Supply	Prescriber Name  10/14/2019	10/14/2019	hydrocodone-homatropine syrup	120ml	4	Ned Sheikh MD  08/30/2019	08/30/2019	clonazepam 1 mg tablet	120	30	Bob Nieves MD  08/30/2019	08/30/2019	triazolam 0.25 mg tablet	45	30	Bob Nieves MD  07/16/2019	07/21/2019	triazolam 0.25 mg tablet	45	30	Bob Nieves MD  06/27/2019	06/28/2019	clonazepam 1 mg tablet	120	30	Bob Nieves MD  06/10/2019	06/11/2019	triazolam 0.25 mg tablet	45	30	Bob Nieves MD  04/30/2019	05/02/2019	clonazepam 1 mg tablet	120	30	Bob Nieves MD  04/30/2019	05/02/2019	triazolam 0.25 mg tablet	45	30	Bob Nieves MD  03/20/2019	03/23/2019	triazolam 0.25 mg tablet	45	30	Bob Nieves MD  01/21/2019	01/22/2019	clonazepam 1 mg tablet	120	30	Bob Nieves MD  01/21/2019	01/22/2019	triazolam 0.25 mg tablet	45	30	Bob Nieves MD  12/19/2018	12/21/2018	clonazepam 1 mg tablet	120	30	Bob Nieves MD  12/19/2018	12/21/2018	triazolam 0.25 mg tablet	45	30	Bob Nieves MD  10/23/2018	10/23/2018	triazolam 0.25 mg tablet	45	30	Bob Nieves MD

## 2019-10-16 NOTE — H&P ADULT - ASSESSMENT
64 year old female with history of essential tremors s/p nerve stimulator presents with one-day history of altered mental status, chest xray concerning for pneumonia, was septic on admission, admitted for further management.

## 2019-10-16 NOTE — H&P ADULT - NSHPSOCIALHISTORY_GEN_ALL_CORE
Discharged
The patient lives with her . She denies drug or tobacco use. She drinks alcohol once a month.

## 2019-10-16 NOTE — PROGRESS NOTE ADULT - PROBLEM SELECTOR PLAN 6
Admitted with Hgb 9.7. Hgb was 13.0 in February 2017.  - may be 2/2 chronic blood loss anemia vs iron deficiency  - will send FOBT

## 2019-10-16 NOTE — H&P ADULT - PROBLEM SELECTOR PLAN 3
A&Ox3 at baseline. Currently A&Ox2-3.  - likely 2/2 sepsis due to pneumonia  - treat sepsis and pneumonia   - continue to monitor A&Ox3 at baseline. Currently A&Ox2-3.  - likely 2/2 sepsis due to pneumonia vs home benzodiazepines vs home hycodan  - holding hycodan  - treat sepsis and pneumonia   - continue to monitor

## 2019-10-16 NOTE — PROGRESS NOTE ADULT - PROBLEM SELECTOR PLAN 4
Admitted with sodium 134.  - likely hypovolemia 2/2 reduced po intake  - s/p 3.5L IVFs  - will recheck BMP

## 2019-10-16 NOTE — H&P ADULT - NSICDXPASTMEDICALHX_GEN_ALL_CORE_FT
PAST MEDICAL HISTORY:  Anxiety     Essential tremor s/p placement of nerve stimulator    Hyperlipidemia

## 2019-10-16 NOTE — H&P ADULT - PROBLEM SELECTOR PLAN 2
CXR with left/mid lower opacity concerning for pneumonia vs atelectasis. Patient with dry cough and requiring supplemental oxygen  - continue with azithromycin and ceftriaxone  - will send urine legionella  - dudarlene adamn  - robitussin for cough  - wean supplemental oxygen as tolerated CXR with left/mid lower opacity concerning for pneumonia vs atelectasis. Patient with dry cough and requiring supplemental oxygen  - continue with azithromycin and ceftriaxone  - will send urine legionella  - rm   - robitussin for cough  - wean supplemental oxygen as tolerated

## 2019-10-16 NOTE — H&P ADULT - PROBLEM SELECTOR PLAN 1
Patient admitted with T103.4, , BP 75/64 mm Hg. s/p 3.5L NS bolus, azithromycin and ceftriaxone in the ED.   - etiology likely 2/2 pneumonia  - BCX and UCX sent; follow up on results  - continue with antibiotics  - continue with midodrine 10 mg q8h  - as per MICU, will hold off on IV fluids for now  - acetaminophen prn for fevers  - trend CBC daily  - monitor vitals

## 2019-10-16 NOTE — PROGRESS NOTE ADULT - PROBLEM SELECTOR PLAN 3
A&Ox3 at baseline. Currently A&Ox2-3.  - likely 2/2 sepsis due to pneumonia vs home benzodiazepines vs home hycodan  - holding hycodan  - treat sepsis and pneumonia   - continue to monitor

## 2019-10-16 NOTE — H&P ADULT - NSHPPHYSICALEXAM_GEN_ALL_CORE
Vital Signs Last 24 Hrs  T(C): 37.9 (16 Oct 2019 04:32), Max: 39.7 (15 Oct 2019 18:40)  T(F): 100.3 (16 Oct 2019 04:32), Max: 103.4 (15 Oct 2019 18:40)  HR: 102 (16 Oct 2019 04:32) (70 - 102)  BP: 136/74 (16 Oct 2019 04:32) (74/58 - 136/74)  BP(mean): 77 (16 Oct 2019 04:00) (57 - 79)  RR: 18 (16 Oct 2019 04:32) (16 - 20)  SpO2: 94% (16 Oct 2019 04:32) (91% - 100%)    General: Not in acute distress, elderly  Head: Normocephalic, Atraumatic, nasal cannula  Eyes: PERRLA, EOMI, normal sclera  Throat: Moist mucous membranes  Respiratory: Diffuse expiratory wheezing, reduced breath sounds, poor respiratory effort, no crackles or rales  CV: RRR, S1/S2, no murmurs, rubs or gallops  Abdominal: Soft, bowel sounds present, NT, ND   Extremities: No edema, 2+ DP pulses  Neurological: A&Ox2-3, MAEx4, non-focal  Skin: No rashes

## 2019-10-16 NOTE — PROGRESS NOTE ADULT - SUBJECTIVE AND OBJECTIVE BOX
Johnnie Ross MD  PGY 1 Department of Internal Medicine  Pager: [phone]    Patient is a 64y old  Female who presents with a chief complaint of altered mental status (16 Oct 2019 05:57)      SUBJECTIVE / OVERNIGHT EVENTS: Pt seen and examined. No acute overnight events. Complains of cough, no sputum production and little SOB. Instructed to use incentive spirometry    CONSTITUTIONAL:  No weakness, fevers, or chills  HEENT:  Eyes:  No visual changes. Ears, Nose, Throat:  No vertigo or throat pain  NECK: No pain or stiffness  SKIN:  No rash or itching.  CARDIOVASCULAR:  No chest pain, chest pressure or chest discomfort. No palpitations.  RESPIRATORY:  No sputum or hemoptysis.  GASTROINTESTINAL:  No abdominal pain, N/V, or constipation/diarrhea  GENITOURINARY:  Denies hematuria, dysuria.   NEUROLOGICAL:  No numbness or weakness  MUSCULOSKELETAL:  No muscle, back pain, joint pain or stiffness.  All other review of systems is negative unless indicated above      MEDICATIONS  (STANDING):  ALBUTerol    90 MICROgram(s) HFA Inhaler 1 Puff(s) Inhalation every 4 hours  ALBUTerol/ipratropium for Nebulization 3 milliLiter(s) Nebulizer every 4 hours  atorvastatin 10 milliGRAM(s) Oral at bedtime  azithromycin  IVPB 500 milliGRAM(s) IV Intermittent every 24 hours  cefTRIAXone   IVPB 1000 milliGRAM(s) IV Intermittent every 24 hours  citalopram 40 milliGRAM(s) Oral <User Schedule>  enoxaparin Injectable 40 milliGRAM(s) SubCutaneous daily  influenza   Vaccine 0.5 milliLiter(s) IntraMuscular once  midodrine 10 milliGRAM(s) Oral every 8 hours  tiotropium 18 MICROgram(s) Capsule 1 Capsule(s) Inhalation daily    MEDICATIONS  (PRN):  acetaminophen   Tablet .. 650 milliGRAM(s) Oral every 6 hours PRN Temp greater or equal to 38.5C (101.3F)  clonazePAM  Tablet 1 milliGRAM(s) Oral two times a day PRN anxiety  guaiFENesin    Syrup 200 milliGRAM(s) Oral every 6 hours PRN Cough      I&O's Summary      Vital Signs Last 24 Hrs  T(C): 38.5 (16 Oct 2019 08:02), Max: 39.7 (15 Oct 2019 18:40)  T(F): 101.3 (16 Oct 2019 08:02), Max: 103.4 (15 Oct 2019 18:40)  HR: 84 (16 Oct 2019 08:02) (70 - 102)  BP: 133/92 (16 Oct 2019 08:02) (74/58 - 136/74)  BP(mean): 77 (16 Oct 2019 04:00) (57 - 79)  RR: 19 (16 Oct 2019 08:02) (16 - 20)  SpO2: 92% (16 Oct 2019 08:02) (91% - 100%)    CAPILLARY BLOOD GLUCOSE    PHYSICAL EXAM:  GENERAL: NAD, on 4L O2 comfortable  HEAD:  Atraumatic, Normocephalic  EYES: EOMI, PERRL, conjunctiva and sclera clear  NECK: No JVD  CHEST/LUNG: cough when asked to take deep breath  HEART: Regular rate and rhythm; No murmurs, rubs, or gallops  ABDOMEN: Soft, Nontender, Nondistended; Bowel sounds present  EXTREMITIES:  2+ Peripheral Pulses, No clubbing, cyanosis, or edema  PSYCH: AAOx3  NEUROLOGY: non-focal  SKIN: No rashes or lesions    LABS:                        9.2    6.39  )-----------( 159      ( 16 Oct 2019 07:10 )             27.8     Auto Eosinophil # 0.10  / Auto Eosinophil % 1.6   / Auto Neutrophil # 5.22  / Auto Neutrophil % 81.6  / BANDS % x                            9.7    5.40  )-----------( 178      ( 15 Oct 2019 19:02 )             29.9     Auto Eosinophil # 0.08  / Auto Eosinophil % 1.5   / Auto Neutrophil # 4.10  / Auto Neutrophil % 75.8  / BANDS % x        10-16    137  |  104  |  14  ----------------------------<  102<H>  4.3   |  22  |  0.80  10-15    134<L>  |  100  |  16  ----------------------------<  121<H>  4.8   |  24  |  1.06    Ca    8.3<L>      16 Oct 2019 07:10  Mg     1.8     10-16  Phos  2.4     10-16  TPro  5.8<L>  /  Alb  2.8<L>  /  TBili  0.4  /  DBili  x   /  AST  35  /  ALT  27  /  AlkPhos  65  10-16  TPro  6.2  /  Alb  3.0<L>  /  TBili  0.5  /  DBili  x   /  AST  34  /  ALT  25  /  AlkPhos  67  10-15    PT/INR - ( 15 Oct 2019 19:02 )   PT: 13.5 sec;   INR: 1.18 ratio         PTT - ( 15 Oct 2019 19:02 )  PTT:25.9 sec      Urinalysis Basic - ( 16 Oct 2019 01:17 )    Color: Light Yellow / Appearance: Clear / S.009 / pH: x  Gluc: x / Ketone: Negative  / Bili: Negative / Urobili: Negative   Blood: x / Protein: 30 mg/dL / Nitrite: Negative   Leuk Esterase: Negative / RBC: 7 /hpf / WBC 3 /HPF   Sq Epi: x / Non Sq Epi: 1 /hpf / Bacteria: Negative            RADIOLOGY & ADDITIONAL TESTS:    Imaging Personally Reviewed:    Consultant(s) Notes Reviewed:      Care Discussed with Consultants/Other Providers: Johnnie Ross MD  PGY 1 Department of Internal Medicine  Pager: [phone]    Patient is a 64y old  Female who presents with a chief complaint of altered mental status (16 Oct 2019 05:57)      SUBJECTIVE / OVERNIGHT EVENTS: Pt seen and examined. No acute overnight events. Complains of cough, no sputum production and little SOB. Instructed to use incentive spirometry.     CONSTITUTIONAL:  No weakness, fevers, or chills  HEENT:  Eyes:  No visual changes. Ears, Nose, Throat:  No vertigo or throat pain  NECK: No pain or stiffness  SKIN:  No rash or itching.  CARDIOVASCULAR:  No chest pain, chest pressure or chest discomfort. No palpitations.  RESPIRATORY:  No sputum or hemoptysis. +sob.   GASTROINTESTINAL:  No abdominal pain, N/V, or constipation/diarrhea  GENITOURINARY:  Denies hematuria, dysuria.   NEUROLOGICAL:  No numbness or weakness  MUSCULOSKELETAL:  No muscle, back pain, joint pain or stiffness.  All other review of systems is negative unless indicated above      MEDICATIONS  (STANDING):  ALBUTerol    90 MICROgram(s) HFA Inhaler 1 Puff(s) Inhalation every 4 hours  ALBUTerol/ipratropium for Nebulization 3 milliLiter(s) Nebulizer every 4 hours  atorvastatin 10 milliGRAM(s) Oral at bedtime  azithromycin  IVPB 500 milliGRAM(s) IV Intermittent every 24 hours  cefTRIAXone   IVPB 1000 milliGRAM(s) IV Intermittent every 24 hours  citalopram 40 milliGRAM(s) Oral <User Schedule>  enoxaparin Injectable 40 milliGRAM(s) SubCutaneous daily  influenza   Vaccine 0.5 milliLiter(s) IntraMuscular once  midodrine 10 milliGRAM(s) Oral every 8 hours  tiotropium 18 MICROgram(s) Capsule 1 Capsule(s) Inhalation daily    MEDICATIONS  (PRN):  acetaminophen   Tablet .. 650 milliGRAM(s) Oral every 6 hours PRN Temp greater or equal to 38.5C (101.3F)  clonazePAM  Tablet 1 milliGRAM(s) Oral two times a day PRN anxiety  guaiFENesin    Syrup 200 milliGRAM(s) Oral every 6 hours PRN Cough      I&O's Summary      Vital Signs Last 24 Hrs  T(C): 38.5 (16 Oct 2019 08:02), Max: 39.7 (15 Oct 2019 18:40)  T(F): 101.3 (16 Oct 2019 08:02), Max: 103.4 (15 Oct 2019 18:40)  HR: 84 (16 Oct 2019 08:02) (70 - 102)  BP: 133/92 (16 Oct 2019 08:02) (74/58 - 136/74)  BP(mean): 77 (16 Oct 2019 04:00) (57 - 79)  RR: 19 (16 Oct 2019 08:02) (16 - 20)  SpO2: 92% (16 Oct 2019 08:02) (91% - 100%)    CAPILLARY BLOOD GLUCOSE    PHYSICAL EXAM:  GENERAL: NAD, on 4L O2 comfortable  HEAD:  Atraumatic, Normocephalic  EYES: EOMI, PERRL, conjunctiva and sclera clear  NECK: No JVD  CHEST/LUNG: cough when asked to take deep breath  HEART: Regular rate and rhythm; No murmurs, rubs, or gallops. Extremities warm and well perfused.   ABDOMEN: Soft, Nontender, Nondistended; Bowel sounds present  EXTREMITIES:  2+ Peripheral Pulses, No clubbing, cyanosis, or edema  PSYCH: AAOx3  NEUROLOGY: non-focal  SKIN: No rashes or lesions    LABS:                        9.2    6.39  )-----------( 159      ( 16 Oct 2019 07:10 )             27.8     Auto Eosinophil # 0.10  / Auto Eosinophil % 1.6   / Auto Neutrophil # 5.22  / Auto Neutrophil % 81.6  / BANDS % x                            9.7    5.40  )-----------( 178      ( 15 Oct 2019 19:02 )             29.9     Auto Eosinophil # 0.08  / Auto Eosinophil % 1.5   / Auto Neutrophil # 4.10  / Auto Neutrophil % 75.8  / BANDS % x        10-16    137  |  104  |  14  ----------------------------<  102<H>  4.3   |  22  |  0.80  10-15    134<L>  |  100  |  16  ----------------------------<  121<H>  4.8   |  24  |  1.06    Ca    8.3<L>      16 Oct 2019 07:10  Mg     1.8     10-16  Phos  2.4     10-16  TPro  5.8<L>  /  Alb  2.8<L>  /  TBili  0.4  /  DBili  x   /  AST  35  /  ALT  27  /  AlkPhos  65  10-16  TPro  6.2  /  Alb  3.0<L>  /  TBili  0.5  /  DBili  x   /  AST  34  /  ALT  25  /  AlkPhos  67  10-15    PT/INR - ( 15 Oct 2019 19:02 )   PT: 13.5 sec;   INR: 1.18 ratio         PTT - ( 15 Oct 2019 19:02 )  PTT:25.9 sec      Urinalysis Basic - ( 16 Oct 2019 01:17 )    Color: Light Yellow / Appearance: Clear / S.009 / pH: x  Gluc: x / Ketone: Negative  / Bili: Negative / Urobili: Negative   Blood: x / Protein: 30 mg/dL / Nitrite: Negative   Leuk Esterase: Negative / RBC: 7 /hpf / WBC 3 /HPF   Sq Epi: x / Non Sq Epi: 1 /hpf / Bacteria: Negative            RADIOLOGY & ADDITIONAL TESTS:    Imaging Personally Reviewed:    Consultant(s) Notes Reviewed:      Care Discussed with Consultants/Other Providers:

## 2019-10-16 NOTE — PROGRESS NOTE ADULT - PROBLEM SELECTOR PLAN 2
CXR with left/mid lower opacity concerning for pneumonia vs atelectasis. Patient with dry cough and requiring supplemental oxygen  - continue with azithromycin and ceftriaxone  - will send urine legionella  - rm   - robitussin for cough  - wean supplemental oxygen as tolerated COMMUNITY ACQUIRED PNEUMONIA  CXR with left/mid lower opacity concerning for pneumonia vs atelectasis. Patient with dry cough and requiring supplemental oxygen  - continue with azithromycin and ceftriaxone  - will send urine legionella  - dujessiebs   - robitussin for cough  - wean supplemental oxygen as tolerated

## 2019-10-16 NOTE — CHART NOTE - NSCHARTNOTEFT_GEN_A_CORE
[facility]  Dept. Internal Medicine    TO BE COMPLETED WITHIN 6 HOURS OF INITIAL ASSESSMENT:    For use in patients that have 2 sepsis criteria and new organ dysfunction   •	New or increased oxygen requirement  •	Creatinine >2mg/dL  •	Bilirubin>2mg/dL  •	Platelet <100,00/mm3  •	INR >1.5, PTT>60  •	Lactate >2    If patient persistent hypotension (SBP<90) or any lactate >4 then provider evaluation (Physician/PA/NP) within 30 minutes of bolus completion is required.    Vital Signs Last 24 Hrs  T(C): 36.8 (16 Oct 2019 00:15), Max: 39.7 (15 Oct 2019 18:40)  T(F): 98.3 (16 Oct 2019 00:15), Max: 103.4 (15 Oct 2019 18:40)  HR: 73 (16 Oct 2019 01:00) (73 - 98)  BP: 98/55 (16 Oct 2019 01:00) (74/58 - 113/73)  BP(mean): 67 (16 Oct 2019 01:00) (57 - 75)  RR: 16 (16 Oct 2019 01:00) (16 - 20)  SpO2: 96% (16 Oct 2019 01:00) (91% - 100%)  		  LUNGS:  [  ]Clear bilaterally [ x ] Wheeze [  ] Rhonchi [  ] Rales [  ] Crackles; Other:  HEART: [ x ]RRR [ x ] No murmur[  ]  Normal S1S2[  ] Tachycardia;  Other:  CAPILLARY REFULL:  	Fingers: [  ] less than 2 seconds [ x ] more than 2 seconds                                           Toes: [  ]  less than 2 seconds [  ] more than 2 seconds   PERIPHERAL PULSES:  Radial: [ x ] Palpable  [  ]  non-palpable                                         Dorsalis Pedis: [ x ] Palpable  [  ] non-palpable                                         Posterior Tibial: [  ] Palpable  [  ] non-palpable                                          Other:  SKIN:   [  ]  Diaphoretic  [  ]  mottling  [  ]  Cold extremities  [ x ]  Warm [  ]  Dry                      Other:    BEDSIDE ULTRASOUND FINDINGS (IF APPLICABLE):  B-lines present anteriorly and basilar area    Labs:  15 Oct 2019 19:02    134    |  100    |  16     ----------------------------<  121    4.8     |  24     |  1.06     Ca    8.2        15 Oct 2019 19:02    TPro  6.2    /  Alb  3.0    /  TBili  0.5    /  DBili  x      /  AST  34     /  ALT  25     /  AlkPhos  67     15 Oct 2019 19:02                          9.7    5.40  )-----------( 178      ( 15 Oct 2019 19:02 )             29.9     PT/INR - ( 15 Oct 2019 19:02 )   PT: 13.5 sec;   INR: 1.18 ratio         PTT - ( 15 Oct 2019 19:02 )  PTT:25.9 sec  Lactate:    Plan (orders must be placed in EMR):     [  ]  Check Repeat Lactate   [ x ]  No change in current plan  [  ]  Start Vasopressors:  [  ]  Repeat Fluid Bolus:  [  ] other:    Care Discussed with Consultants/Other Providers [ x ] YES  [ ] NO    65 yo F PMHx essential tremors s/p nerve stimulator p/w AMS, dyspnea, generalized weakness in setting of severe sepsis 2/2 pneumonia.   - Per MICU assessment will need to hold further fluids due to concern of B-lines present on US  - Initiating midodrine 10 Q8  - Received azithromycin and ceftriaxone, continue for CAP coverage  - Received 3.4 L, blood and urine cx pending    Vanessa Dela Cruz MD  Internal Medicine, PGY-3  Pager [phone]

## 2019-10-16 NOTE — H&P ADULT - HISTORY OF PRESENT ILLNESS
64 year old female with history of essential tremors s/p nerve stimulator presents with altered mental status. Last Thursday, the patient traveled to [place] with her  to look at houses that were for sale. Two days ago, she started feeling unwell. She started experiencing fever, chills, dizziness, night sweats, dry cough with occasional phlegm, and shortness of breath. She also experienced reduced po intake due to loss of appetite. Yesterday, she went to her PCP's office and a covering doctor noticed signs of pneumonia and bronchitis on her chest X-ray, so she was started on antibiotics. She was able to sleep better, but her  said that she was incoherent last night and called an ambulance to bring her to the ED. She currently has trouble remembering days and names of medications.    In the ED, vitals were significant for T103.4F,  bpm, BP 75/64 mm Hg, RR 16-20 bpm, and 91-98% 2L NC  She received azithromycin 500 mg x1, ceftriaxone 1g x1, 3.5L NS bolus (total), acetaminophen 650 mg x1, and duonebs x2.  A code sepsis was called and MICU due to hypotension, and no further fluids were provided due to B-lines on ultrasound. Midodrine 10 mg q8h was initiated with improvement in blood pressure.

## 2019-10-16 NOTE — H&P ADULT - PROBLEM SELECTOR PROBLEM 1
Sepsis, due to unspecified organism, unspecified whether acute organ dysfunction present Severe sepsis

## 2019-10-16 NOTE — CONSULT NOTE ADULT - ASSESSMENT
65 yo F with a PMHx of essential tremors s/p nerve stimulator and HLD who p/w AMS, SOB, cough, and malaise in setting of severe sepsis 2/2 pneumonia with a course c/b labile BPs s/p 3.4L with mild B-lines bilaterally on POCUS.     Recs:  - start midodrine 10mg TID  - antibiotics per primary team  - f/u BCx, UCx  - hold further IVFs  -re-consult PRN    Jay Buckley MD/MS  PGY-2 Internal Medicine  [facility]  Pager# [phone] ([facility])/[phone] ([facility])

## 2019-10-16 NOTE — H&P ADULT - NSHPLABSRESULTS_GEN_ALL_CORE
LABS:                          9.7    5.40  )-----------( 178      ( 15 Oct 2019 19:02 )             29.9     Hb Trend: 9.7<--  WBC Trend: 5.40<--  Plt Trend: 178<--          10-15    134<L>  |  100  |  16  ----------------------------<  121<H>  4.8   |  24  |  1.06    Ca    8.2<L>      15 Oct 2019 19:02    TPro  6.2  /  Alb  3.0<L>  /  TBili  0.5  /  DBili  x   /  AST  34  /  ALT  25  /  AlkPhos  67  10-15      PT/INR - ( 15 Oct 2019 19:02 )   PT: 13.5 sec;   INR: 1.18 ratio         PTT - ( 15 Oct 2019 19:02 )  PTT:25.9 sec  Urinalysis Basic - ( 16 Oct 2019 01:17 )    Color: Light Yellow / Appearance: Clear / S.009 / pH: x  Gluc: x / Ketone: Negative  / Bili: Negative / Urobili: Negative   Blood: x / Protein: 30 mg/dL / Nitrite: Negative   Leuk Esterase: Negative / RBC: 7 /hpf / WBC 3 /HPF   Sq Epi: x / Non Sq Epi: 1 /hpf / Bacteria: Negative      CAPILLARY BLOOD GLUCOSE              IMAGING:    < from: Xray Chest 1 View AP/PA (10.15.19 @ 20:26) >      ******PRELIMINARY REPORT******    ******PRELIMINARY REPORT******          EXAM:  XR CHEST AP OR PA 1V                            PROCEDURE DATE:  10/15/2019      ******PRELIMINARY REPORT******    ******PRELIMINARY REPORT******              INTERPRETATION:  left mid/lower lung opacity likely pneumonia vs   atelectasis.     follow up official report in AM    < end of copied text >

## 2019-10-16 NOTE — CONSULT NOTE ADULT - ATTENDING COMMENTS
Patient seen and examined.  Agree with resident note as above.  Patient with hx as noted who presents with dyspnea/cough/malaise and found to have fever/hypoTN/L sided PNA.  BP has responded to IVF, though is still below baseline of ~110.  Patient is awake and alert, breahting comfortably at rest though still with considerable cough and now wheeze.  POCUS shows good ventricular fcn, lots of posterior and some anterior B lines, plethoric IVC.  Recs as above and I have edited as appropriate - follow cx, antibiotics for CAP, add midodrine to support BP.  Would not give any further fluid resuscitation.  If patient has recurrent hypoTN she will require pressors (discussed with her and with admitting MAR).  NOK .  FULL CODE.

## 2019-10-17 LAB
ALBUMIN SERPL ELPH-MCNC: 3 G/DL — LOW (ref 3.3–5)
ALP SERPL-CCNC: 74 U/L — SIGNIFICANT CHANGE UP (ref 40–120)
ALT FLD-CCNC: 35 U/L — SIGNIFICANT CHANGE UP (ref 10–45)
ANION GAP SERPL CALC-SCNC: 13 MMOL/L — SIGNIFICANT CHANGE UP (ref 5–17)
AST SERPL-CCNC: 40 U/L — SIGNIFICANT CHANGE UP (ref 10–40)
BILIRUB SERPL-MCNC: 0.6 MG/DL — SIGNIFICANT CHANGE UP (ref 0.2–1.2)
BUN SERPL-MCNC: 8 MG/DL — SIGNIFICANT CHANGE UP (ref 7–23)
CALCIUM SERPL-MCNC: 8.8 MG/DL — SIGNIFICANT CHANGE UP (ref 8.4–10.5)
CHLORIDE SERPL-SCNC: 104 MMOL/L — SIGNIFICANT CHANGE UP (ref 96–108)
CO2 SERPL-SCNC: 25 MMOL/L — SIGNIFICANT CHANGE UP (ref 22–31)
CREAT SERPL-MCNC: 0.69 MG/DL — SIGNIFICANT CHANGE UP (ref 0.5–1.3)
CULTURE RESULTS: SIGNIFICANT CHANGE UP
FERRITIN SERPL-MCNC: 559 NG/ML — HIGH (ref 15–150)
GLUCOSE SERPL-MCNC: 109 MG/DL — HIGH (ref 70–99)
GRAM STN FLD: SIGNIFICANT CHANGE UP
HCT VFR BLD CALC: 28.7 % — LOW (ref 34.5–45)
HCV AB S/CO SERPL IA: 0.16 S/CO — SIGNIFICANT CHANGE UP (ref 0–0.99)
HCV AB SERPL-IMP: SIGNIFICANT CHANGE UP
HGB BLD-MCNC: 9.5 G/DL — LOW (ref 11.5–15.5)
IRON SATN MFR SERPL: 16 UG/DL — LOW (ref 30–160)
IRON SATN MFR SERPL: 8 % — LOW (ref 14–50)
LEGIONELLA AG UR QL: NEGATIVE — SIGNIFICANT CHANGE UP
MAGNESIUM SERPL-MCNC: 1.9 MG/DL — SIGNIFICANT CHANGE UP (ref 1.6–2.6)
MCHC RBC-ENTMCNC: 31 PG — SIGNIFICANT CHANGE UP (ref 27–34)
MCHC RBC-ENTMCNC: 33.1 GM/DL — SIGNIFICANT CHANGE UP (ref 32–36)
MCV RBC AUTO: 93.8 FL — SIGNIFICANT CHANGE UP (ref 80–100)
PHOSPHATE SERPL-MCNC: 2.5 MG/DL — SIGNIFICANT CHANGE UP (ref 2.5–4.5)
PLATELET # BLD AUTO: 209 K/UL — SIGNIFICANT CHANGE UP (ref 150–400)
POTASSIUM SERPL-MCNC: 3.9 MMOL/L — SIGNIFICANT CHANGE UP (ref 3.5–5.3)
POTASSIUM SERPL-SCNC: 3.9 MMOL/L — SIGNIFICANT CHANGE UP (ref 3.5–5.3)
PROT SERPL-MCNC: 6.3 G/DL — SIGNIFICANT CHANGE UP (ref 6–8.3)
RBC # BLD: 3.06 M/UL — LOW (ref 3.8–5.2)
RBC # FLD: 13.7 % — SIGNIFICANT CHANGE UP (ref 10.3–14.5)
SODIUM SERPL-SCNC: 142 MMOL/L — SIGNIFICANT CHANGE UP (ref 135–145)
SPECIMEN SOURCE: SIGNIFICANT CHANGE UP
SPECIMEN SOURCE: SIGNIFICANT CHANGE UP
TIBC SERPL-MCNC: 195 UG/DL — LOW (ref 220–430)
UIBC SERPL-MCNC: 179 UG/DL — SIGNIFICANT CHANGE UP (ref 110–370)
WBC # BLD: 6.56 K/UL — SIGNIFICANT CHANGE UP (ref 3.8–10.5)
WBC # FLD AUTO: 6.56 K/UL — SIGNIFICANT CHANGE UP (ref 3.8–10.5)

## 2019-10-17 PROCEDURE — 99233 SBSQ HOSP IP/OBS HIGH 50: CPT | Mod: GC

## 2019-10-17 RX ORDER — MIDODRINE HYDROCHLORIDE 2.5 MG/1
5 TABLET ORAL EVERY 8 HOURS
Refills: 0 | Status: DISCONTINUED | OUTPATIENT
Start: 2019-10-17 | End: 2019-10-18

## 2019-10-17 RX ORDER — SENNA PLUS 8.6 MG/1
1 TABLET ORAL DAILY
Refills: 0 | Status: DISCONTINUED | OUTPATIENT
Start: 2019-10-17 | End: 2019-10-20

## 2019-10-17 RX ORDER — MIDODRINE HYDROCHLORIDE 2.5 MG/1
5 TABLET ORAL EVERY 8 HOURS
Refills: 0 | Status: DISCONTINUED | OUTPATIENT
Start: 2019-10-17 | End: 2019-10-17

## 2019-10-17 RX ORDER — GUAIFENESIN/DEXTROMETHORPHAN 600MG-30MG
30 TABLET, EXTENDED RELEASE 12 HR ORAL EVERY 8 HOURS
Refills: 0 | Status: DISCONTINUED | OUTPATIENT
Start: 2019-10-17 | End: 2019-10-17

## 2019-10-17 RX ORDER — DOCUSATE SODIUM 100 MG
100 CAPSULE ORAL DAILY
Refills: 0 | Status: DISCONTINUED | OUTPATIENT
Start: 2019-10-17 | End: 2019-10-20

## 2019-10-17 RX ORDER — POLYETHYLENE GLYCOL 3350 17 G/17G
17 POWDER, FOR SOLUTION ORAL DAILY
Refills: 0 | Status: DISCONTINUED | OUTPATIENT
Start: 2019-10-17 | End: 2019-10-17

## 2019-10-17 RX ORDER — GUAIFENESIN/DEXTROMETHORPHAN 600MG-30MG
5 TABLET, EXTENDED RELEASE 12 HR ORAL EVERY 8 HOURS
Refills: 0 | Status: DISCONTINUED | OUTPATIENT
Start: 2019-10-17 | End: 2019-10-20

## 2019-10-17 RX ADMIN — Medication 3 MILLILITER(S): at 14:48

## 2019-10-17 RX ADMIN — Medication 650 MILLIGRAM(S): at 01:53

## 2019-10-17 RX ADMIN — Medication 650 MILLIGRAM(S): at 04:55

## 2019-10-17 RX ADMIN — Medication 650 MILLIGRAM(S): at 13:30

## 2019-10-17 RX ADMIN — SENNA PLUS 1 TABLET(S): 8.6 TABLET ORAL at 14:48

## 2019-10-17 RX ADMIN — Medication 200 MILLIGRAM(S): at 11:45

## 2019-10-17 RX ADMIN — CEFTRIAXONE 100 MILLIGRAM(S): 500 INJECTION, POWDER, FOR SOLUTION INTRAMUSCULAR; INTRAVENOUS at 20:28

## 2019-10-17 RX ADMIN — ATORVASTATIN CALCIUM 10 MILLIGRAM(S): 80 TABLET, FILM COATED ORAL at 21:35

## 2019-10-17 RX ADMIN — MIDODRINE HYDROCHLORIDE 5 MILLIGRAM(S): 2.5 TABLET ORAL at 21:35

## 2019-10-17 RX ADMIN — Medication 100 MILLIGRAM(S): at 14:48

## 2019-10-17 RX ADMIN — ENOXAPARIN SODIUM 40 MILLIGRAM(S): 100 INJECTION SUBCUTANEOUS at 11:45

## 2019-10-17 RX ADMIN — AZITHROMYCIN 250 MILLIGRAM(S): 500 TABLET, FILM COATED ORAL at 20:51

## 2019-10-17 RX ADMIN — PANTOPRAZOLE SODIUM 40 MILLIGRAM(S): 20 TABLET, DELAYED RELEASE ORAL at 06:02

## 2019-10-17 RX ADMIN — Medication 3 MILLILITER(S): at 17:38

## 2019-10-17 RX ADMIN — Medication 3 MILLILITER(S): at 05:31

## 2019-10-17 RX ADMIN — Medication 3 MILLILITER(S): at 01:24

## 2019-10-17 RX ADMIN — Medication 100 MILLIGRAM(S): at 13:21

## 2019-10-17 RX ADMIN — Medication 100 MILLIGRAM(S): at 05:31

## 2019-10-17 RX ADMIN — Medication 1 MILLIGRAM(S): at 08:59

## 2019-10-17 RX ADMIN — Medication 200 MILLIGRAM(S): at 01:24

## 2019-10-17 RX ADMIN — Medication 200 MILLIGRAM(S): at 05:31

## 2019-10-17 RX ADMIN — MIDODRINE HYDROCHLORIDE 10 MILLIGRAM(S): 2.5 TABLET ORAL at 05:31

## 2019-10-17 RX ADMIN — Medication 650 MILLIGRAM(S): at 12:24

## 2019-10-17 RX ADMIN — Medication 3 MILLILITER(S): at 09:31

## 2019-10-17 RX ADMIN — MIDODRINE HYDROCHLORIDE 5 MILLIGRAM(S): 2.5 TABLET ORAL at 13:21

## 2019-10-17 RX ADMIN — Medication 100 MILLIGRAM(S): at 21:35

## 2019-10-17 RX ADMIN — Medication 3 MILLILITER(S): at 21:34

## 2019-10-17 RX ADMIN — CITALOPRAM 40 MILLIGRAM(S): 10 TABLET, FILM COATED ORAL at 08:59

## 2019-10-17 RX ADMIN — Medication 5 MILLILITER(S): at 21:35

## 2019-10-17 NOTE — PHYSICAL THERAPY INITIAL EVALUATION ADULT - PRECAUTIONS/LIMITATIONS, REHAB EVAL
She also experienced reduced po intake due to loss of appetite. Yesterday, she went to her PCP's office and a covering doctor noticed signs of pneumonia and bronchitis on her chest X-ray, so she was started on antibiotics. She was able to sleep better, but her  said that she was incoherent last night and called an ambulance to bring her to the ED. A code sepsis was called and MICU due to hypotension. CXR: Left mid to lower lung patchy opacity, likely pneumonia versus atelectasis.

## 2019-10-17 NOTE — PHYSICAL THERAPY INITIAL EVALUATION ADULT - GAIT DEVIATIONS NOTED, PT EVAL
decreased step length/decreased weight-shifting ability/decreased velocity of limb motion/decreased sunitha

## 2019-10-17 NOTE — PHYSICAL THERAPY INITIAL EVALUATION ADULT - PLANNED THERAPY INTERVENTIONS, PT EVAL
bed mobility training/transfer training/gait training/stair negotiation: GOAL: Pt will be able to negotiate 10 steps +HR independently with reciprocal pattern in 2 weeks.

## 2019-10-17 NOTE — PROVIDER CONTACT NOTE (OTHER) - ACTION/TREATMENT ORDERED:
650 mg Tylenol given
Take rectal temp and admin tylenol if temp above 101.3, continue to monitor patient's cough, next dose of robitussin due in about 2hrs.

## 2019-10-17 NOTE — CHART NOTE - NSCHARTNOTEFT_GEN_A_CORE
SUBJECTIVE:  Patient was seen and examined at the bedside this evening. Endorses cough, nausea due to coughing, constipation (last BM ~3 days ago), dizziness and lightheadedness when sitting upright and with movement. Patient also notes that "it feels warm in here", but denied fever, chills, vomiting, abdominal pain, wheezing, and SOB.       OBJECTIVE:  Vital Signs Last 24 Hrs  T(C): 37.9 (10-17-19 @ 00:05), Max: 38.5 (10-16-19 @ 08:02)  T(F): 100.2 (10-17-19 @ 00:05), Max: 101.3 (10-16-19 @ 08:02)  HR: 91 (10-17-19 @ 00:05) (70 - 102)  BP: 122/65 (10-17-19 @ 00:05) (93/69 - 136/74)  BP(mean): 77 (10-16-19 @ 04:00) (67 - 79)  RR: 19 (10-17-19 @ 00:05) (16 - 21)  SpO2: 93% (10-17-19 @ 00:05) (91% - 100%)    PHYSICAL EXAM:  GENERAL: Elderly female in NAD, resting in bed. Coughing, especially when asked to take a deep breath  HEAD:  Atraumatic, Normocephalic  EYES: EOMI, conjunctiva and sclera clear  CHEST/LUNG: Challenging exam; patient coughing when taking deep breaths. Shallowing breathing to prevent cough  HEART: Regular rate and rhythm; No murmurs, rubs, or gallops  ABDOMEN: Soft, Nontender, Nondistended; Bowel sounds present  EXTREMITIES: No LE edema  SKIN: Warm and dry  NERVOUS SYSTEM:  Alert & Oriented X3, Good concentration      PLAN:  - vitals are stable. Continue to monitor  - can give additional one time dose of Robitussin for cough  - primary team to follow; possible bowel regimen

## 2019-10-17 NOTE — PROGRESS NOTE ADULT - PROBLEM SELECTOR PLAN 1
Patient admitted with T103.4, , BP 75/64 mm Hg. s/p 3.5L NS bolus, azithromycin and ceftriaxone in the ED.   - etiology likely 2/2 pneumonia  - BCX and UCX sent; follow up on results  - continue with antibiotics  - continue with midodrine 10 mg q8h  - as per MICU, will hold off on IV fluids for now  - acetaminophen prn for fevers  - trend CBC daily  - monitor vitals Patient admitted with T103.4, , BP 75/64 mm Hg. s/p 3.5L NS bolus, azithromycin and ceftriaxone in the ED.   - etiology likely 2/2 pneumonia  - BCX NGTD and UCX normal  - continue with antibiotics  - continue with midodrine 5 mg q8h  - as per MICU, will hold off on IV fluids for now  - acetaminophen prn for fevers  - trend CBC daily  - monitor vitals

## 2019-10-17 NOTE — PROGRESS NOTE ADULT - PROBLEM SELECTOR PLAN 2
COMMUNITY ACQUIRED PNEUMONIA  CXR with left/mid lower opacity concerning for pneumonia vs atelectasis. Patient with dry cough and requiring supplemental oxygen  - continue with azithromycin and ceftriaxone  - will send urine legionella  - dujessiebs   - robitussin for cough  - wean supplemental oxygen as tolerated COMMUNITY ACQUIRED PNEUMONIA  CXR with left/mid lower opacity concerning for pneumonia vs atelectasis. Patient with dry cough and requiring supplemental oxygen  - continue with azithromycin and ceftriaxone  - urine legionella pending  - carmen PRN  - robitussin for cough  - wean supplemental oxygen as tolerated

## 2019-10-17 NOTE — PHYSICAL THERAPY INITIAL EVALUATION ADULT - TRANSFER TRAINING, PT EVAL
How Severe Is Your Nodular Acne?: moderate Is This A New Presentation, Or A Follow-Up?: Acne GOAL: Pt will perform all transfers independently with use of appropriate AD in 2 weeks.

## 2019-10-17 NOTE — PROGRESS NOTE ADULT - SUBJECTIVE AND OBJECTIVE BOX
Johnnie Ross MD  PGY 1 Department of Internal Medicine  Pager: [phone]    Patient is a 64y old  Female who presents with a chief complaint of altered mental status (16 Oct 2019 09:23)      SUBJECTIVE / OVERNIGHT EVENTS: Pt seen and examined. No acute overnight events.    CONSTITUTIONAL:  No weakness, fevers, or chills  HEENT:  Eyes:  No visual changes. Ears, Nose, Throat:  No vertigo or throat pain  NECK: No pain or stiffness  SKIN:  No rash or itching.  CARDIOVASCULAR:  No chest pain, chest pressure or chest discomfort. No palpitations.  RESPIRATORY:  No shortness of breath, cough or sputum or hemoptysis.  GASTROINTESTINAL:  No abdominal pain, N/V, or constipation/diarrhea  GENITOURINARY:  Denies hematuria, dysuria.   NEUROLOGICAL:  No numbness or weakness  MUSCULOSKELETAL:  No muscle, back pain, joint pain or stiffness.  All other review of systems is negative unless indicated above      MEDICATIONS  (STANDING):  ALBUTerol    90 MICROgram(s) HFA Inhaler 1 Puff(s) Inhalation every 4 hours  ALBUTerol/ipratropium for Nebulization 3 milliLiter(s) Nebulizer every 4 hours  atorvastatin 10 milliGRAM(s) Oral at bedtime  azithromycin  IVPB 500 milliGRAM(s) IV Intermittent every 24 hours  benzonatate 100 milliGRAM(s) Oral every 8 hours  cefTRIAXone   IVPB 1000 milliGRAM(s) IV Intermittent every 24 hours  citalopram 40 milliGRAM(s) Oral <User Schedule>  enoxaparin Injectable 40 milliGRAM(s) SubCutaneous daily  influenza   Vaccine 0.5 milliLiter(s) IntraMuscular once  midodrine 10 milliGRAM(s) Oral every 8 hours  pantoprazole    Tablet 40 milliGRAM(s) Oral before breakfast  tiotropium 18 MICROgram(s) Capsule 1 Capsule(s) Inhalation daily    MEDICATIONS  (PRN):  acetaminophen   Tablet .. 650 milliGRAM(s) Oral every 6 hours PRN Temp greater or equal to 38.5C (101.3F)  clonazePAM  Tablet 1 milliGRAM(s) Oral two times a day PRN anxiety  guaiFENesin    Syrup 200 milliGRAM(s) Oral every 6 hours PRN Cough      I&O's Summary      Vital Signs Last 24 Hrs  T(C): 37.4 (17 Oct 2019 08:00), Max: 38.9 (17 Oct 2019 01:50)  T(F): 99.3 (17 Oct 2019 08:00), Max: 102 (17 Oct 2019 01:50)  HR: 84 (17 Oct 2019 08:00) (71 - 91)  BP: 101/57 (17 Oct 2019 08:00) (95/62 - 122/65)  BP(mean): --  RR: 21 (17 Oct 2019 08:00) (19 - 21)  SpO2: 93% (17 Oct 2019 08:00) (91% - 99%)    CAPILLARY BLOOD GLUCOSE        PHYSICAL EXAM:  GENERAL: NAD, on 4L O2 comfortable  HEAD:  Atraumatic, Normocephalic  EYES: EOMI, PERRL, conjunctiva and sclera clear  NECK: No JVD  CHEST/LUNG: cough when asked to take deep breath  HEART: Regular rate and rhythm; No murmurs, rubs, or gallops. Extremities warm and well perfused.   ABDOMEN: Soft, Nontender, Nondistended; Bowel sounds present  EXTREMITIES:  2+ Peripheral Pulses, No clubbing, cyanosis, or edema  PSYCH: AAOx3  NEUROLOGY: non-focal  SKIN: No rashes or lesions      LABS:                        9.5    6.56  )-----------( 209      ( 17 Oct 2019 07:42 )             28.7     Auto Eosinophil # x     / Auto Eosinophil % x     / Auto Neutrophil # x     / Auto Neutrophil % x     / BANDS % x                            9.2    6.39  )-----------( 159      ( 16 Oct 2019 07:10 )             27.8     Auto Eosinophil # 0.10  / Auto Eosinophil % 1.6   / Auto Neutrophil # 5.22  / Auto Neutrophil % 81.6  / BANDS % x                            9.7    5.40  )-----------( 178      ( 15 Oct 2019 19:02 )             29.9     Auto Eosinophil # 0.08  / Auto Eosinophil % 1.5   / Auto Neutrophil # 4.10  / Auto Neutrophil % 75.8  / BANDS % x        10-17    142  |  104  |  8   ----------------------------<  109<H>  3.9   |  25  |  0.69  10-16    137  |  104  |  14  ----------------------------<  102<H>  4.3   |  22  |  0.80  10-15    134<L>  |  100  |  16  ----------------------------<  121<H>  4.8   |  24  |  1.06    Ca    8.8      17 Oct 2019 06:02  Mg     1.9     10-17  Phos  2.5     10-17  TPro  6.3  /  Alb  3.0<L>  /  TBili  0.6  /  DBili  x   /  AST  40  /  ALT  35  /  AlkPhos  74  10-17  TPro  5.8<L>  /  Alb  2.8<L>  /  TBili  0.4  /  DBili  x   /  AST  35  /  ALT  27  /  AlkPhos  65  10-16  TPro  6.2  /  Alb  3.0<L>  /  TBili  0.5  /  DBili  x   /  AST  34  /  ALT  25  /  AlkPhos  67  10-15    PT/INR - ( 15 Oct 2019 19:02 )   PT: 13.5 sec;   INR: 1.18 ratio         PTT - ( 15 Oct 2019 19:02 )  PTT:25.9 sec      Urinalysis Basic - ( 16 Oct 2019 01:17 )    Color: Light Yellow / Appearance: Clear / S.009 / pH: x  Gluc: x / Ketone: Negative  / Bili: Negative / Urobili: Negative   Blood: x / Protein: 30 mg/dL / Nitrite: Negative   Leuk Esterase: Negative / RBC: 7 /hpf / WBC 3 /HPF   Sq Epi: x / Non Sq Epi: 1 /hpf / Bacteria: Negative            RADIOLOGY & ADDITIONAL TESTS:    Imaging Personally Reviewed:    Consultant(s) Notes Reviewed:      Care Discussed with Consultants/Other Providers: Johnnie Ross MD  PGY 1 Department of Internal Medicine  Pager: [phone]    Patient is a 64y old  Female who presents with a chief complaint of altered mental status (16 Oct 2019 09:23)      SUBJECTIVE / OVERNIGHT EVENTS: Pt seen and examined. No acute overnight events.  Has cough that did not improve with tessalon.  No more sob.     CONSTITUTIONAL:  No weakness, fevers, or chills  HEENT:  Eyes:  No visual changes. Ears, Nose, Throat:  No vertigo or throat pain  NECK: No pain or stiffness  SKIN:  No rash or itching.  CARDIOVASCULAR:  No chest pain, chest pressure or chest discomfort. No palpitations.  RESPIRATORY:  No shortness of breath, or sputum or hemoptysis. +cough.   GASTROINTESTINAL:  No abdominal pain, N/V, or constipation/diarrhea  GENITOURINARY:  Denies hematuria, dysuria.   NEUROLOGICAL:  No numbness or weakness  MUSCULOSKELETAL:  No muscle, back pain, joint pain or stiffness.  All other review of systems is negative unless indicated above      MEDICATIONS  (STANDING):  ALBUTerol    90 MICROgram(s) HFA Inhaler 1 Puff(s) Inhalation every 4 hours  ALBUTerol/ipratropium for Nebulization 3 milliLiter(s) Nebulizer every 4 hours  atorvastatin 10 milliGRAM(s) Oral at bedtime  azithromycin  IVPB 500 milliGRAM(s) IV Intermittent every 24 hours  benzonatate 100 milliGRAM(s) Oral every 8 hours  cefTRIAXone   IVPB 1000 milliGRAM(s) IV Intermittent every 24 hours  citalopram 40 milliGRAM(s) Oral <User Schedule>  enoxaparin Injectable 40 milliGRAM(s) SubCutaneous daily  influenza   Vaccine 0.5 milliLiter(s) IntraMuscular once  midodrine 10 milliGRAM(s) Oral every 8 hours  pantoprazole    Tablet 40 milliGRAM(s) Oral before breakfast  tiotropium 18 MICROgram(s) Capsule 1 Capsule(s) Inhalation daily    MEDICATIONS  (PRN):  acetaminophen   Tablet .. 650 milliGRAM(s) Oral every 6 hours PRN Temp greater or equal to 38.5C (101.3F)  clonazePAM  Tablet 1 milliGRAM(s) Oral two times a day PRN anxiety  guaiFENesin    Syrup 200 milliGRAM(s) Oral every 6 hours PRN Cough      I&O's Summary      Vital Signs Last 24 Hrs  T(C): 37.4 (17 Oct 2019 08:00), Max: 38.9 (17 Oct 2019 01:50)  T(F): 99.3 (17 Oct 2019 08:00), Max: 102 (17 Oct 2019 01:50)  HR: 84 (17 Oct 2019 08:00) (71 - 91)  BP: 101/57 (17 Oct 2019 08:00) (95/62 - 122/65)  BP(mean): --  RR: 21 (17 Oct 2019 08:00) (19 - 21)  SpO2: 93% (17 Oct 2019 08:00) (91% - 99%)    CAPILLARY BLOOD GLUCOSE        PHYSICAL EXAM:  GENERAL: NAD, on 4L O2 comfortable  HEAD:  Atraumatic, Normocephalic  EYES: EOMI, PERRL, conjunctiva and sclera clear  NECK: No JVD  CHEST/LUNG: cough when asked to take deep breath. Rhonchi b/l. No wheezing or rales b/l.   HEART: Regular rate and rhythm; No murmurs, rubs, or gallops. Extremities warm and well perfused.   ABDOMEN: Soft, Nontender, Nondistended; Bowel sounds present  EXTREMITIES:  2+ Peripheral Pulses, No clubbing, cyanosis, or edema  PSYCH: AAOx3  NEUROLOGY: non-focal  SKIN: No rashes or lesions      LABS:                        9.5    6.56  )-----------( 209      ( 17 Oct 2019 07:42 )             28.7     Auto Eosinophil # x     / Auto Eosinophil % x     / Auto Neutrophil # x     / Auto Neutrophil % x     / BANDS % x                            9.2    6.39  )-----------( 159      ( 16 Oct 2019 07:10 )             27.8     Auto Eosinophil # 0.10  / Auto Eosinophil % 1.6   / Auto Neutrophil # 5.22  / Auto Neutrophil % 81.6  / BANDS % x                            9.7    5.40  )-----------( 178      ( 15 Oct 2019 19:02 )             29.9     Auto Eosinophil # 0.08  / Auto Eosinophil % 1.5   / Auto Neutrophil # 4.10  / Auto Neutrophil % 75.8  / BANDS % x        10-17    142  |  104  |  8   ----------------------------<  109<H>  3.9   |  25  |  0.69  10-16    137  |  104  |  14  ----------------------------<  102<H>  4.3   |  22  |  0.80  10-15    134<L>  |  100  |  16  ----------------------------<  121<H>  4.8   |  24  |  1.06    Ca    8.8      17 Oct 2019 06:02  Mg     1.9     10-  Phos  2.5     10-  TPro  6.3  /  Alb  3.0<L>  /  TBili  0.6  /  DBili  x   /  AST  40  /  ALT  35  /  AlkPhos  74  10-  TPro  5.8<L>  /  Alb  2.8<L>  /  TBili  0.4  /  DBili  x   /  AST  35  /  ALT  27  /  AlkPhos  65  10-16  TPro  6.2  /  Alb  3.0<L>  /  TBili  0.5  /  DBili  x   /  AST  34  /  ALT  25  /  AlkPhos  67  10-15    PT/INR - ( 15 Oct 2019 19:02 )   PT: 13.5 sec;   INR: 1.18 ratio         PTT - ( 15 Oct 2019 19:02 )  PTT:25.9 sec      Urinalysis Basic - ( 16 Oct 2019 01:17 )    Color: Light Yellow / Appearance: Clear / S.009 / pH: x  Gluc: x / Ketone: Negative  / Bili: Negative / Urobili: Negative   Blood: x / Protein: 30 mg/dL / Nitrite: Negative   Leuk Esterase: Negative / RBC: 7 /hpf / WBC 3 /HPF   Sq Epi: x / Non Sq Epi: 1 /hpf / Bacteria: Negative            RADIOLOGY & ADDITIONAL TESTS:    Imaging Personally Reviewed:    Consultant(s) Notes Reviewed:      Care Discussed with Consultants/Other Providers:

## 2019-10-17 NOTE — CHART NOTE - NSCHARTNOTEFT_GEN_A_CORE
SUBJECTIVE:  Patient was seen and examined at the bedside this evening for evaluation of SOB. Patient endorses cough, occasional chills, occasional nausea, and SOB, along with shallow breathing, but states that everything has improved as compared to the night prior. She denies fever, vomiting, CP, palpitations, wheezing, and abdominal pain.      OBJECTIVE:  Vital Signs Last 24 Hrs  T(C): 37.9 (10-17-19 @ 20:22), Max: 38.9 (10-17-19 @ 01:50)  T(F): 100.2 (10-17-19 @ 20:22), Max: 102 (10-17-19 @ 01:50)  HR: 82 (10-17-19 @ 20:22) (76 - 92)  BP: 100/61 (10-17-19 @ 20:22) (100/61 - 122/65)  RR: 18 (10-17-19 @ 20:22) (18 - 21)  SpO2: 97% (10-17-19 @ 20:22) (92% - 97%)    PHYSICAL EXAM:  GENERAL: Middle-aged female receiving breathing treatment. Patient was seen shaking, which she states she does all the time and on purpose  HEAD:  Atraumatic, Normocephalic  EYES: EOMI, conjunctiva and sclera clear  CHEST/LUNG: Challenging exam as patient takes shallow breaths and frequently coughs  HEART: Tachycardic; No murmurs, rubs, or gallops  ABDOMEN: Soft, Nontender, Nondistended; Bowel sounds present  EXTREMITIES: No LE edema  SKIN: Warm and dry  NERVOUS SYSTEM:  Alert & Oriented X3, Good concentration      PLAN:  Breathing is improving and vitals have been stable  - continue to monitor respiratory status  - c/w current regimen  - primary team to follow in the AM

## 2019-10-17 NOTE — PHYSICAL THERAPY INITIAL EVALUATION ADULT - PERTINENT HX OF CURRENT PROBLEM, REHAB EVAL
Pt is a 63 y/o female admitted to [facility] on 10/16/19 h/o essential tremors s/p nerve stimulator presents with altered mental status. Last Thursday, the patient traveled to [place] with her  to look at houses that were for sale. Two days ago, she started feeling unwell. She started experiencing fever, chills, dizziness, night sweats, dry cough with occasional phlegm, and shortness of breath.

## 2019-10-18 LAB
ALBUMIN SERPL ELPH-MCNC: 2.9 G/DL — LOW (ref 3.3–5)
ALP SERPL-CCNC: 80 U/L — SIGNIFICANT CHANGE UP (ref 40–120)
ALT FLD-CCNC: 33 U/L — SIGNIFICANT CHANGE UP (ref 10–45)
ANION GAP SERPL CALC-SCNC: 14 MMOL/L — SIGNIFICANT CHANGE UP (ref 5–17)
AST SERPL-CCNC: 34 U/L — SIGNIFICANT CHANGE UP (ref 10–40)
BILIRUB SERPL-MCNC: 0.7 MG/DL — SIGNIFICANT CHANGE UP (ref 0.2–1.2)
BUN SERPL-MCNC: 7 MG/DL — SIGNIFICANT CHANGE UP (ref 7–23)
CALCIUM SERPL-MCNC: 8.8 MG/DL — SIGNIFICANT CHANGE UP (ref 8.4–10.5)
CHLORIDE SERPL-SCNC: 101 MMOL/L — SIGNIFICANT CHANGE UP (ref 96–108)
CO2 SERPL-SCNC: 27 MMOL/L — SIGNIFICANT CHANGE UP (ref 22–31)
CREAT SERPL-MCNC: 0.62 MG/DL — SIGNIFICANT CHANGE UP (ref 0.5–1.3)
GLUCOSE SERPL-MCNC: 125 MG/DL — HIGH (ref 70–99)
HCT VFR BLD CALC: 26.9 % — LOW (ref 34.5–45)
HGB BLD-MCNC: 8.9 G/DL — LOW (ref 11.5–15.5)
MAGNESIUM SERPL-MCNC: 1.9 MG/DL — SIGNIFICANT CHANGE UP (ref 1.6–2.6)
MCHC RBC-ENTMCNC: 30.9 PG — SIGNIFICANT CHANGE UP (ref 27–34)
MCHC RBC-ENTMCNC: 33.1 GM/DL — SIGNIFICANT CHANGE UP (ref 32–36)
MCV RBC AUTO: 93.4 FL — SIGNIFICANT CHANGE UP (ref 80–100)
OB PNL STL: NEGATIVE — SIGNIFICANT CHANGE UP
PHOSPHATE SERPL-MCNC: 1.8 MG/DL — LOW (ref 2.5–4.5)
PLATELET # BLD AUTO: 254 K/UL — SIGNIFICANT CHANGE UP (ref 150–400)
POTASSIUM SERPL-MCNC: 3.4 MMOL/L — LOW (ref 3.5–5.3)
POTASSIUM SERPL-SCNC: 3.4 MMOL/L — LOW (ref 3.5–5.3)
PROT SERPL-MCNC: 6.5 G/DL — SIGNIFICANT CHANGE UP (ref 6–8.3)
RBC # BLD: 2.88 M/UL — LOW (ref 3.8–5.2)
RBC # FLD: 14 % — SIGNIFICANT CHANGE UP (ref 10.3–14.5)
SODIUM SERPL-SCNC: 142 MMOL/L — SIGNIFICANT CHANGE UP (ref 135–145)
WBC # BLD: 5.22 K/UL — SIGNIFICANT CHANGE UP (ref 3.8–10.5)
WBC # FLD AUTO: 5.22 K/UL — SIGNIFICANT CHANGE UP (ref 3.8–10.5)

## 2019-10-18 PROCEDURE — 99233 SBSQ HOSP IP/OBS HIGH 50: CPT | Mod: GC

## 2019-10-18 RX ORDER — POTASSIUM PHOSPHATE, MONOBASIC POTASSIUM PHOSPHATE, DIBASIC 236; 224 MG/ML; MG/ML
30 INJECTION, SOLUTION INTRAVENOUS ONCE
Refills: 0 | Status: COMPLETED | OUTPATIENT
Start: 2019-10-18 | End: 2019-10-18

## 2019-10-18 RX ADMIN — Medication 3 MILLILITER(S): at 13:46

## 2019-10-18 RX ADMIN — CEFTRIAXONE 100 MILLIGRAM(S): 500 INJECTION, POWDER, FOR SOLUTION INTRAMUSCULAR; INTRAVENOUS at 21:01

## 2019-10-18 RX ADMIN — Medication 100 MILLIGRAM(S): at 13:46

## 2019-10-18 RX ADMIN — Medication 5 MILLILITER(S): at 05:45

## 2019-10-18 RX ADMIN — Medication 1 MILLIGRAM(S): at 23:31

## 2019-10-18 RX ADMIN — Medication 100 MILLIGRAM(S): at 21:02

## 2019-10-18 RX ADMIN — Medication 3 MILLILITER(S): at 09:43

## 2019-10-18 RX ADMIN — Medication 3 MILLILITER(S): at 01:25

## 2019-10-18 RX ADMIN — Medication 5 MILLILITER(S): at 13:46

## 2019-10-18 RX ADMIN — CITALOPRAM 40 MILLIGRAM(S): 10 TABLET, FILM COATED ORAL at 09:15

## 2019-10-18 RX ADMIN — PANTOPRAZOLE SODIUM 40 MILLIGRAM(S): 20 TABLET, DELAYED RELEASE ORAL at 06:15

## 2019-10-18 RX ADMIN — POTASSIUM PHOSPHATE, MONOBASIC POTASSIUM PHOSPHATE, DIBASIC 83.33 MILLIMOLE(S): 236; 224 INJECTION, SOLUTION INTRAVENOUS at 12:40

## 2019-10-18 RX ADMIN — Medication 3 MILLILITER(S): at 05:45

## 2019-10-18 RX ADMIN — ENOXAPARIN SODIUM 40 MILLIGRAM(S): 100 INJECTION SUBCUTANEOUS at 12:39

## 2019-10-18 RX ADMIN — Medication 5 MILLILITER(S): at 21:02

## 2019-10-18 RX ADMIN — Medication 3 MILLILITER(S): at 21:01

## 2019-10-18 RX ADMIN — MIDODRINE HYDROCHLORIDE 5 MILLIGRAM(S): 2.5 TABLET ORAL at 05:45

## 2019-10-18 RX ADMIN — ATORVASTATIN CALCIUM 10 MILLIGRAM(S): 80 TABLET, FILM COATED ORAL at 21:02

## 2019-10-18 RX ADMIN — Medication 100 MILLIGRAM(S): at 05:45

## 2019-10-18 RX ADMIN — Medication 100 MILLIGRAM(S): at 12:40

## 2019-10-18 NOTE — PROGRESS NOTE ADULT - PROBLEM SELECTOR PLAN 4
Admitted with sodium 134.  - likely hypovolemia 2/2 reduced po intake  - s/p 3.5L IVFs  - will recheck BMP - resolved  - Admitted with sodium 134.  - likely hypovolemia 2/2 reduced po intake  - s/p 3.5L IVFs  - - resolved  - Admitted with sodium 134.  - likely hypovolemia 2/2 reduced po intake  - s/p 3.5L IVFs

## 2019-10-18 NOTE — PROGRESS NOTE ADULT - PROBLEM SELECTOR PLAN 2
COMMUNITY ACQUIRED PNEUMONIA  CXR with left/mid lower opacity concerning for pneumonia vs atelectasis. Patient with dry cough and requiring supplemental oxygen  - continue with azithromycin and ceftriaxone  - urine legionella pending  - duonebs Q4H  - robitussin for cough  - wean supplemental oxygen as tolerated COMMUNITY ACQUIRED PNEUMONIA  CXR with left/mid lower opacity concerning for pneumonia vs atelectasis. Patient with dry cough and requiring supplemental oxygen  - continue with azithromycin and ceftriaxone  - urine legionella negative  - duonebs Q4H  - robitussin for cough  - wean supplemental oxygen as tolerated COMMUNITY ACQUIRED PNEUMONIA  CXR with left/mid lower opacity concerning for pneumonia vs atelectasis. Patient with dry cough and requiring supplemental oxygen  - continue with azithromycin and ceftriaxone  - urine legionella negative  - duonebs Q4H  - robitussin for cough  - wean supplemental oxygen as tolerated  - sputum- few yeast cells, +cocci COMMUNITY ACQUIRED PNEUMONIA  CXR with left/mid lower opacity concerning for pneumonia vs atelectasis. Patient with dry cough and requiring supplemental oxygen  - continue with azithromycin and ceftriaxone  - urine legionella negative  - duonebs Q4H  - robitussin for cough  - wean supplemental oxygen as tolerated  - sputum- few yeast cells, +cocci in pairs, not specific COMMUNITY ACQUIRED PNEUMONIA  HYPOXIC RESPIRATORY FAILURE as a result.   CXR with left/mid lower opacity concerning for pneumonia vs atelectasis. Patient with dry cough and requiring supplemental oxygen  - continue with azithromycin and ceftriaxone  - urine legionella negative  - duonebs Q4H  - robitussin for cough  - wean supplemental oxygen as tolerated  - sputum- few yeast cells, +cocci in pairs, not specific

## 2019-10-18 NOTE — PROGRESS NOTE ADULT - SUBJECTIVE AND OBJECTIVE BOX
Johnnie Ross MD  PGY 1 Department of Internal Medicine  Pager: [phone]    Patient is a 64y old  Female who presents with a chief complaint of altered mental status (17 Oct 2019 09:08)      SUBJECTIVE / OVERNIGHT EVENTS: Pt seen and examined. No acute overnight events.    CONSTITUTIONAL:  No weakness, fevers, or chills  HEENT:  Eyes:  No visual changes. Ears, Nose, Throat:  No vertigo or throat pain  NECK: No pain or stiffness  SKIN:  No rash or itching.  CARDIOVASCULAR:  No chest pain, chest pressure or chest discomfort. No palpitations.  RESPIRATORY:  No shortness of breath, cough or sputum or hemoptysis.  GASTROINTESTINAL:  No abdominal pain, N/V, or constipation/diarrhea  GENITOURINARY:  Denies hematuria, dysuria.   NEUROLOGICAL:  No numbness or weakness  MUSCULOSKELETAL:  No muscle, back pain, joint pain or stiffness.  All other review of systems is negative unless indicated above      MEDICATIONS  (STANDING):  ALBUTerol    90 MICROgram(s) HFA Inhaler 1 Puff(s) Inhalation every 4 hours  ALBUTerol/ipratropium for Nebulization 3 milliLiter(s) Nebulizer every 4 hours  atorvastatin 10 milliGRAM(s) Oral at bedtime  azithromycin  IVPB 500 milliGRAM(s) IV Intermittent every 24 hours  benzonatate 100 milliGRAM(s) Oral every 8 hours  cefTRIAXone   IVPB 1000 milliGRAM(s) IV Intermittent every 24 hours  citalopram 40 milliGRAM(s) Oral <User Schedule>  docusate sodium 100 milliGRAM(s) Oral daily  enoxaparin Injectable 40 milliGRAM(s) SubCutaneous daily  guaiFENesin/dextromethorphan  Syrup 5 milliLiter(s) Oral every 8 hours  influenza   Vaccine 0.5 milliLiter(s) IntraMuscular once  midodrine 5 milliGRAM(s) Oral every 8 hours  pantoprazole    Tablet 40 milliGRAM(s) Oral before breakfast  senna 1 Tablet(s) Oral daily  tiotropium 18 MICROgram(s) Capsule 1 Capsule(s) Inhalation daily    MEDICATIONS  (PRN):  acetaminophen   Tablet .. 650 milliGRAM(s) Oral every 6 hours PRN Temp greater or equal to 38.5C (101.3F)  clonazePAM  Tablet 1 milliGRAM(s) Oral two times a day PRN anxiety      I&O's Summary    17 Oct 2019 07:01  -  18 Oct 2019 07:00  --------------------------------------------------------  IN: 120 mL / OUT: 0 mL / NET: 120 mL        Vital Signs Last 24 Hrs  T(C): 37.6 (18 Oct 2019 08:15), Max: 38.5 (17 Oct 2019 11:59)  T(F): 99.7 (18 Oct 2019 08:15), Max: 101.3 (17 Oct 2019 11:59)  HR: 83 (18 Oct 2019 08:15) (82 - 98)  BP: 106/56 (18 Oct 2019 08:15) (99/61 - 112/72)  BP(mean): --  RR: 17 (18 Oct 2019 08:15) (17 - 21)  SpO2: 96% (18 Oct 2019 08:15) (94% - 97%)    CAPILLARY BLOOD GLUCOSE      PHYSICAL EXAM:  GENERAL: NAD, on 4L O2 comfortable  HEAD:  Atraumatic, Normocephalic  EYES: EOMI, PERRL, conjunctiva and sclera clear  NECK: No JVD  CHEST/LUNG: cough when asked to take deep breath. Rhonchi b/l. No wheezing or rales b/l.   HEART: Regular rate and rhythm; No murmurs, rubs, or gallops. Extremities warm and well perfused.   ABDOMEN: Soft, Nontender, Nondistended; Bowel sounds present  EXTREMITIES:  2+ Peripheral Pulses, No clubbing, cyanosis, or edema  PSYCH: AAOx3  NEUROLOGY: non-focal  SKIN: No rashes or lesions      LABS:                        9.5    6.56  )-----------( 209      ( 17 Oct 2019 07:42 )             28.7     Auto Eosinophil # x     / Auto Eosinophil % x     / Auto Neutrophil # x     / Auto Neutrophil % x     / BANDS % x        10-18    142  |  101  |  7   ----------------------------<  125<H>  3.4<L>   |  27  |  0.62  10-17    142  |  104  |  8   ----------------------------<  109<H>  3.9   |  25  |  0.69    Ca    8.8      18 Oct 2019 06:46  Mg     1.9     10-18  Phos  1.8     10-18  TPro  6.5  /  Alb  2.9<L>  /  TBili  0.7  /  DBili  x   /  AST  34  /  ALT  33  /  AlkPhos  80  10-18  TPro  6.3  /  Alb  3.0<L>  /  TBili  0.6  /  DBili  x   /  AST  40  /  ALT  35  /  AlkPhos  74  10-17        RADIOLOGY & ADDITIONAL TESTS:    Imaging Personally Reviewed:    Consultant(s) Notes Reviewed:      Care Discussed with Consultants/Other Providers: Johnnie Ross MD  PGY 1 Department of Internal Medicine  Pager: [phone]    Patient is a 64y old  Female who presents with a chief complaint of altered mental status (17 Oct 2019 09:08)      SUBJECTIVE / OVERNIGHT EVENTS: Pt seen and examined. No acute overnight events.    CONSTITUTIONAL:  No weakness, fevers, or chills  HEENT:  Eyes:  No visual changes. Ears, Nose, Throat:  No vertigo or throat pain  NECK: No pain or stiffness  SKIN:  No rash or itching.  CARDIOVASCULAR:  No chest pain, chest pressure or chest discomfort. No palpitations.  RESPIRATORY:  No shortness of breath, cough or sputum or hemoptysis.  GASTROINTESTINAL:  No abdominal pain, N/V, or constipation/diarrhea  GENITOURINARY:  Denies hematuria, dysuria.   NEUROLOGICAL:  No numbness or weakness  MUSCULOSKELETAL:  No muscle, back pain, joint pain or stiffness.  All other review of systems is negative unless indicated above      MEDICATIONS  (STANDING):  ALBUTerol    90 MICROgram(s) HFA Inhaler 1 Puff(s) Inhalation every 4 hours  ALBUTerol/ipratropium for Nebulization 3 milliLiter(s) Nebulizer every 4 hours  atorvastatin 10 milliGRAM(s) Oral at bedtime  azithromycin  IVPB 500 milliGRAM(s) IV Intermittent every 24 hours  benzonatate 100 milliGRAM(s) Oral every 8 hours  cefTRIAXone   IVPB 1000 milliGRAM(s) IV Intermittent every 24 hours  citalopram 40 milliGRAM(s) Oral <User Schedule>  docusate sodium 100 milliGRAM(s) Oral daily  enoxaparin Injectable 40 milliGRAM(s) SubCutaneous daily  guaiFENesin/dextromethorphan  Syrup 5 milliLiter(s) Oral every 8 hours  influenza   Vaccine 0.5 milliLiter(s) IntraMuscular once  midodrine 5 milliGRAM(s) Oral every 8 hours  pantoprazole    Tablet 40 milliGRAM(s) Oral before breakfast  senna 1 Tablet(s) Oral daily  tiotropium 18 MICROgram(s) Capsule 1 Capsule(s) Inhalation daily    MEDICATIONS  (PRN):  acetaminophen   Tablet .. 650 milliGRAM(s) Oral every 6 hours PRN Temp greater or equal to 38.5C (101.3F)  clonazePAM  Tablet 1 milliGRAM(s) Oral two times a day PRN anxiety      I&O's Summary    17 Oct 2019 07:01  -  18 Oct 2019 07:00  --------------------------------------------------------  IN: 120 mL / OUT: 0 mL / NET: 120 mL        Vital Signs Last 24 Hrs  T(C): 37.6 (18 Oct 2019 08:15), Max: 38.5 (17 Oct 2019 11:59)  T(F): 99.7 (18 Oct 2019 08:15), Max: 101.3 (17 Oct 2019 11:59)  HR: 83 (18 Oct 2019 08:15) (82 - 98)  BP: 106/56 (18 Oct 2019 08:15) (99/61 - 112/72)  BP(mean): --  RR: 17 (18 Oct 2019 08:15) (17 - 21)  SpO2: 96% (18 Oct 2019 08:15) (94% - 97%)    CAPILLARY BLOOD GLUCOSE      PHYSICAL EXAM:  GENERAL: NAD  HEAD:  Atraumatic, Normocephalic  EYES: EOMI, PERRL, conjunctiva and sclera clear  NECK: No JVD  CHEST/LUNG: cough when asked to take deep breath. Rhonchi b/l. No wheezing or rales b/l.   HEART: Regular rate and rhythm; No murmurs, rubs, or gallops. Extremities warm and well perfused.   ABDOMEN: Soft, Nontender, Nondistended; Bowel sounds present  EXTREMITIES:  2+ Peripheral Pulses, No clubbing, cyanosis, or edema  PSYCH: AAOx3  NEUROLOGY: non-focal  SKIN: No rashes or lesions      LABS:                        9.5    6.56  )-----------( 209      ( 17 Oct 2019 07:42 )             28.7     Auto Eosinophil # x     / Auto Eosinophil % x     / Auto Neutrophil # x     / Auto Neutrophil % x     / BANDS % x        10-18    142  |  101  |  7   ----------------------------<  125<H>  3.4<L>   |  27  |  0.62  10-17    142  |  104  |  8   ----------------------------<  109<H>  3.9   |  25  |  0.69    Ca    8.8      18 Oct 2019 06:46  Mg     1.9     10-18  Phos  1.8     10-18  TPro  6.5  /  Alb  2.9<L>  /  TBili  0.7  /  DBili  x   /  AST  34  /  ALT  33  /  AlkPhos  80  10-18  TPro  6.3  /  Alb  3.0<L>  /  TBili  0.6  /  DBili  x   /  AST  40  /  ALT  35  /  AlkPhos  74  10-17        RADIOLOGY & ADDITIONAL TESTS:    Imaging Personally Reviewed:    Consultant(s) Notes Reviewed:      Care Discussed with Consultants/Other Providers: Johnnie Ross MD  PGY 1 Department of Internal Medicine  Pager: [phone]    Patient is a 64y old  Female who presents with a chief complaint of altered mental status (17 Oct 2019 09:08)      SUBJECTIVE / OVERNIGHT EVENTS: Pt seen and examined. No acute overnight events.  Cough still present.     CONSTITUTIONAL:  No weakness, fevers, or chills  HEENT:  Eyes:  No visual changes. Ears, Nose, Throat:  No vertigo or throat pain  NECK: No pain or stiffness  SKIN:  No rash or itching.  CARDIOVASCULAR:  No chest pain, chest pressure or chest discomfort. No palpitations.  RESPIRATORY:  No shortness of breath, or sputum or hemoptysis. +cough.   GASTROINTESTINAL:  No abdominal pain, N/V, or constipation/diarrhea  GENITOURINARY:  Denies hematuria, dysuria.   NEUROLOGICAL:  No numbness or weakness  MUSCULOSKELETAL:  No muscle, back pain, joint pain or stiffness.  All other review of systems is negative unless indicated above      MEDICATIONS  (STANDING):  ALBUTerol    90 MICROgram(s) HFA Inhaler 1 Puff(s) Inhalation every 4 hours  ALBUTerol/ipratropium for Nebulization 3 milliLiter(s) Nebulizer every 4 hours  atorvastatin 10 milliGRAM(s) Oral at bedtime  azithromycin  IVPB 500 milliGRAM(s) IV Intermittent every 24 hours  benzonatate 100 milliGRAM(s) Oral every 8 hours  cefTRIAXone   IVPB 1000 milliGRAM(s) IV Intermittent every 24 hours  citalopram 40 milliGRAM(s) Oral <User Schedule>  docusate sodium 100 milliGRAM(s) Oral daily  enoxaparin Injectable 40 milliGRAM(s) SubCutaneous daily  guaiFENesin/dextromethorphan  Syrup 5 milliLiter(s) Oral every 8 hours  influenza   Vaccine 0.5 milliLiter(s) IntraMuscular once  midodrine 5 milliGRAM(s) Oral every 8 hours  pantoprazole    Tablet 40 milliGRAM(s) Oral before breakfast  senna 1 Tablet(s) Oral daily  tiotropium 18 MICROgram(s) Capsule 1 Capsule(s) Inhalation daily    MEDICATIONS  (PRN):  acetaminophen   Tablet .. 650 milliGRAM(s) Oral every 6 hours PRN Temp greater or equal to 38.5C (101.3F)  clonazePAM  Tablet 1 milliGRAM(s) Oral two times a day PRN anxiety      I&O's Summary    17 Oct 2019 07:01  -  18 Oct 2019 07:00  --------------------------------------------------------  IN: 120 mL / OUT: 0 mL / NET: 120 mL        Vital Signs Last 24 Hrs  T(C): 37.6 (18 Oct 2019 08:15), Max: 38.5 (17 Oct 2019 11:59)  T(F): 99.7 (18 Oct 2019 08:15), Max: 101.3 (17 Oct 2019 11:59)  HR: 83 (18 Oct 2019 08:15) (82 - 98)  BP: 106/56 (18 Oct 2019 08:15) (99/61 - 112/72)  BP(mean): --  RR: 17 (18 Oct 2019 08:15) (17 - 21)  SpO2: 96% (18 Oct 2019 08:15) (94% - 97%)    CAPILLARY BLOOD GLUCOSE    PHYSICAL EXAM:  GENERAL: NAD, on 4L O2 comfortable  HEAD:  Atraumatic, Normocephalic  EYES: EOMI, PERRL, conjunctiva and sclera clear  NECK: No JVD  CHEST/LUNG: cough when asked to take deep breath. Rhonchi b/l. No wheezing or rales b/l.   HEART: Regular rate and rhythm; No murmurs, rubs, or gallops. Extremities warm and well perfused.   ABDOMEN: Soft, Nontender, Nondistended; Bowel sounds present  EXTREMITIES:  2+ Peripheral Pulses, No clubbing, cyanosis, or edema  PSYCH: AAOx3  NEUROLOGY: non-focal  SKIN: No rashes or lesions      LABS:                        9.5    6.56  )-----------( 209      ( 17 Oct 2019 07:42 )             28.7     Auto Eosinophil # x     / Auto Eosinophil % x     / Auto Neutrophil # x     / Auto Neutrophil % x     / BANDS % x        10-18    142  |  101  |  7   ----------------------------<  125<H>  3.4<L>   |  27  |  0.62  10-17    142  |  104  |  8   ----------------------------<  109<H>  3.9   |  25  |  0.69    Ca    8.8      18 Oct 2019 06:46  Mg     1.9     10-18  Phos  1.8     10-18  TPro  6.5  /  Alb  2.9<L>  /  TBili  0.7  /  DBili  x   /  AST  34  /  ALT  33  /  AlkPhos  80  10-18  TPro  6.3  /  Alb  3.0<L>  /  TBili  0.6  /  DBili  x   /  AST  40  /  ALT  35  /  AlkPhos  74  10-17        RADIOLOGY & ADDITIONAL TESTS:    Imaging Personally Reviewed:    Consultant(s) Notes Reviewed:      Care Discussed with Consultants/Other Providers:

## 2019-10-18 NOTE — PROGRESS NOTE ADULT - ASSESSMENT
64 year old female with history of essential tremors s/p nerve stimulator presents with one-day history of altered mental status, chest xray concerning for pneumonia, was septic on admission, admitted for further management. 64 year old female with history of essential tremors s/p nerve stimulator presents with one-day history of altered mental status, chest xray concerning for pneumonia, was septic on admission, admitted for further management. Wean oxygen, midodrine, and duonebs as tolerated

## 2019-10-18 NOTE — PROGRESS NOTE ADULT - PROBLEM SELECTOR PLAN 1
Patient admitted with T103.4, , BP 75/64 mm Hg. s/p 3.5L NS bolus, azithromycin and ceftriaxone in the ED.   - etiology likely 2/2 pneumonia  - BCX NGTD and UCX normal  - continue with antibiotics  - continue with midodrine 5 mg q8h  - as per MICU, will hold off on IV fluids for now  - acetaminophen prn for fevers  - trend CBC daily  - monitor vitals

## 2019-10-18 NOTE — PROGRESS NOTE ADULT - PROBLEM SELECTOR PLAN 3
A&Ox3 at baseline. Currently A&Ox3  - likely 2/2 sepsis due to pneumonia vs home benzodiazepines vs home hycodan  - holding hycodan  - treat sepsis and pneumonia   - continue to monitor A&Ox3 at baseline. Currently A&Ox3  - likely 2/2 sepsis due to pneumonia vs home benzodiazepines  - patient is not on hycodan  - treat sepsis and pneumonia   - continue to monitor A&Ox3 at baseline. Currently A&Ox3  - likely 2/2 sepsis due to pneumonia vs home benzodiazepines  - patient is not on hycodan (clarified w/ patient, does not appear to be a home medicine)  - treat sepsis and pneumonia   - continue to monitor

## 2019-10-18 NOTE — CHART NOTE - NSCHARTNOTEFT_GEN_A_CORE
SUBJECTIVE:  Patient was seen and examined at the bedside. Patient states she is feeling much better, but still endorses SOB, cough, and occasional nausea due to cough. Denied fevers, chills, vomiting, CP, palpitations, and abdominal pain.      OBJECTIVE:  Vital Signs Last 24 Hrs  T(C): 37.1 (10-18-19 @ 22:11), Max: 37.7 (10-18-19 @ 04:50)  T(F): 98.7 (10-18-19 @ 22:11), Max: 99.9 (10-18-19 @ 04:50)  HR: 83 (10-18-19 @ 22:11) (83 - 98)  BP: 117/73 (10-18-19 @ 22:11) (99/61 - 117/73)  RR: 19 (10-18-19 @ 22:11) (17 - 19)  SpO2: 91% (10-18-19 @ 22:11) (87% - 96%)    PHYSICAL EXAM:  GENERAL: Elderly female in NAD resting in bed with , Harvey at the bedside.   HEAD:  Atraumatic, Normocephalic  EYES: EOMI, conjunctiva and sclera clear  CHEST/LUNG: Challenging exam due to coughing with deep inspiration, but appears to be free of crackles. Patient taking shallow breaths due to the cough  HEART: Regular rate and rhythm; No murmurs, rubs, or gallops  ABDOMEN: Soft, Nontender, Nondistended; Bowel sounds present  EXTREMITIES: No LE edema  SKIN: Warm and dry  NERVOUS SYSTEM:  Alert & Oriented X3, Good concentration      PLAN:  - continue to monitor vitals  - c/w current regimen. Consider extra dose of Robitussin for cough, if patient requests  - primary team to follow in the AM

## 2019-10-19 LAB
ALBUMIN SERPL ELPH-MCNC: 2.9 G/DL — LOW (ref 3.3–5)
ALP SERPL-CCNC: 80 U/L — SIGNIFICANT CHANGE UP (ref 40–120)
ALT FLD-CCNC: 36 U/L — SIGNIFICANT CHANGE UP (ref 10–45)
ANION GAP SERPL CALC-SCNC: 14 MMOL/L — SIGNIFICANT CHANGE UP (ref 5–17)
AST SERPL-CCNC: 29 U/L — SIGNIFICANT CHANGE UP (ref 10–40)
BILIRUB SERPL-MCNC: 0.6 MG/DL — SIGNIFICANT CHANGE UP (ref 0.2–1.2)
BUN SERPL-MCNC: 5 MG/DL — LOW (ref 7–23)
CALCIUM SERPL-MCNC: 8.4 MG/DL — SIGNIFICANT CHANGE UP (ref 8.4–10.5)
CHLORIDE SERPL-SCNC: 104 MMOL/L — SIGNIFICANT CHANGE UP (ref 96–108)
CO2 SERPL-SCNC: 27 MMOL/L — SIGNIFICANT CHANGE UP (ref 22–31)
CREAT SERPL-MCNC: 0.6 MG/DL — SIGNIFICANT CHANGE UP (ref 0.5–1.3)
CULTURE RESULTS: SIGNIFICANT CHANGE UP
GLUCOSE SERPL-MCNC: 98 MG/DL — SIGNIFICANT CHANGE UP (ref 70–99)
HCT VFR BLD CALC: 28.4 % — LOW (ref 34.5–45)
HGB BLD-MCNC: 9.5 G/DL — LOW (ref 11.5–15.5)
MAGNESIUM SERPL-MCNC: 2 MG/DL — SIGNIFICANT CHANGE UP (ref 1.6–2.6)
MCHC RBC-ENTMCNC: 31.4 PG — SIGNIFICANT CHANGE UP (ref 27–34)
MCHC RBC-ENTMCNC: 33.5 GM/DL — SIGNIFICANT CHANGE UP (ref 32–36)
MCV RBC AUTO: 93.7 FL — SIGNIFICANT CHANGE UP (ref 80–100)
PHOSPHATE SERPL-MCNC: 3 MG/DL — SIGNIFICANT CHANGE UP (ref 2.5–4.5)
PLATELET # BLD AUTO: 304 K/UL — SIGNIFICANT CHANGE UP (ref 150–400)
POTASSIUM SERPL-MCNC: 3.9 MMOL/L — SIGNIFICANT CHANGE UP (ref 3.5–5.3)
POTASSIUM SERPL-SCNC: 3.9 MMOL/L — SIGNIFICANT CHANGE UP (ref 3.5–5.3)
PROT SERPL-MCNC: 6.7 G/DL — SIGNIFICANT CHANGE UP (ref 6–8.3)
RBC # BLD: 3.03 M/UL — LOW (ref 3.8–5.2)
RBC # FLD: 14.6 % — HIGH (ref 10.3–14.5)
SODIUM SERPL-SCNC: 145 MMOL/L — SIGNIFICANT CHANGE UP (ref 135–145)
SPECIMEN SOURCE: SIGNIFICANT CHANGE UP
WBC # BLD: 5.09 K/UL — SIGNIFICANT CHANGE UP (ref 3.8–10.5)
WBC # FLD AUTO: 5.09 K/UL — SIGNIFICANT CHANGE UP (ref 3.8–10.5)

## 2019-10-19 PROCEDURE — 99233 SBSQ HOSP IP/OBS HIGH 50: CPT

## 2019-10-19 RX ORDER — IPRATROPIUM/ALBUTEROL SULFATE 18-103MCG
3 AEROSOL WITH ADAPTER (GRAM) INHALATION EVERY 4 HOURS
Refills: 0 | Status: DISCONTINUED | OUTPATIENT
Start: 2019-10-19 | End: 2019-10-20

## 2019-10-19 RX ADMIN — Medication 3 MILLILITER(S): at 09:41

## 2019-10-19 RX ADMIN — Medication 5 MILLILITER(S): at 21:06

## 2019-10-19 RX ADMIN — Medication 3 MILLILITER(S): at 05:02

## 2019-10-19 RX ADMIN — Medication 100 MILLIGRAM(S): at 14:59

## 2019-10-19 RX ADMIN — PANTOPRAZOLE SODIUM 40 MILLIGRAM(S): 20 TABLET, DELAYED RELEASE ORAL at 05:03

## 2019-10-19 RX ADMIN — CEFTRIAXONE 100 MILLIGRAM(S): 500 INJECTION, POWDER, FOR SOLUTION INTRAMUSCULAR; INTRAVENOUS at 21:06

## 2019-10-19 RX ADMIN — Medication 3 MILLILITER(S): at 17:36

## 2019-10-19 RX ADMIN — Medication 1 MILLIGRAM(S): at 21:05

## 2019-10-19 RX ADMIN — Medication 3 MILLILITER(S): at 14:59

## 2019-10-19 RX ADMIN — ATORVASTATIN CALCIUM 10 MILLIGRAM(S): 80 TABLET, FILM COATED ORAL at 21:06

## 2019-10-19 RX ADMIN — Medication 5 MILLILITER(S): at 05:03

## 2019-10-19 RX ADMIN — Medication 5 MILLILITER(S): at 14:59

## 2019-10-19 RX ADMIN — Medication 100 MILLIGRAM(S): at 05:03

## 2019-10-19 RX ADMIN — ENOXAPARIN SODIUM 40 MILLIGRAM(S): 100 INJECTION SUBCUTANEOUS at 12:13

## 2019-10-19 RX ADMIN — Medication 3 MILLILITER(S): at 02:12

## 2019-10-19 RX ADMIN — CITALOPRAM 40 MILLIGRAM(S): 10 TABLET, FILM COATED ORAL at 08:54

## 2019-10-19 RX ADMIN — Medication 100 MILLIGRAM(S): at 21:05

## 2019-10-19 NOTE — PROGRESS NOTE ADULT - PROBLEM SELECTOR PLAN 2
COMMUNITY ACQUIRED PNEUMONIA  HYPOXIC RESPIRATORY FAILURE as a result.   CXR with left/mid lower opacity concerning for pneumonia vs atelectasis. Patient with dry cough and requiring supplemental oxygen  - urine legionella negative, RVP (include mycoplasma, chlamydia) negative, discontinue azithromycin, continue ceftriaxone  - duonebs Q4H  - robitussin for cough  - wean supplemental oxygen as tolerated  - sputum- few yeast cells, +cocci in pairs, not specific

## 2019-10-19 NOTE — PROGRESS NOTE ADULT - PROBLEM SELECTOR PLAN 3
Can use naphcon a for eye related seasonal allergies A&Ox3 at baseline. Currently A&Ox3  - likely 2/2 sepsis due to pneumonia vs home benzodiazepines  - patient is not on hycodan (clarified w/ patient, does not appear to be a home medicine)  - treat sepsis and pneumonia   - continue to monitor

## 2019-10-19 NOTE — PROGRESS NOTE ADULT - PROBLEM SELECTOR PLAN 1
Patient admitted with T103.4, , BP 75/64 mm Hg. s/p 3.5L NS bolus, azithromycin and ceftriaxone in the ED.   - etiology likely 2/2 pneumonia  - BCX NGTD and UCX normal  - continue with antibiotics  - as per MICU, will hold off on IV fluids for now  - acetaminophen prn for fevers  - trend CBC daily  - monitor vitals

## 2019-10-19 NOTE — PROGRESS NOTE ADULT - ASSESSMENT
64 year old female with history of essential tremors s/p nerve stimulator presents with one-day history of altered mental status, chest xray concerning for pneumonia, was septic on admission, admitted for further management. Wean oxygen, midodrine, and duonebs as tolerated

## 2019-10-19 NOTE — PROGRESS NOTE ADULT - SUBJECTIVE AND OBJECTIVE BOX
Johnnie Ross MD  PGY 1 Department of Internal Medicine  Pager: [phone]    Patient is a 64y old  Female who presents with a chief complaint of altered mental status (18 Oct 2019 08:46)    SUBJECTIVE / OVERNIGHT EVENTS: Pt seen and examined. No acute overnight events. Patient oxygen saturation on room air was 91%.     CONSTITUTIONAL:  No weakness, fevers, or chills  HEENT:  Eyes:  No visual changes. Ears, Nose, Throat:  No vertigo or throat pain  NECK: No pain or stiffness  SKIN:  No rash or itching.  CARDIOVASCULAR:  No chest pain, chest pressure or chest discomfort. No palpitations.  RESPIRATORY:  No shortness of breath, cough or sputum or hemoptysis.  GASTROINTESTINAL:  No abdominal pain, N/V, or constipation/diarrhea  GENITOURINARY:  Denies hematuria, dysuria.   NEUROLOGICAL:  No numbness or weakness  MUSCULOSKELETAL:  No muscle, back pain, joint pain or stiffness.  All other review of systems is negative unless indicated above      MEDICATIONS  (STANDING):  ALBUTerol    90 MICROgram(s) HFA Inhaler 1 Puff(s) Inhalation every 4 hours  ALBUTerol/ipratropium for Nebulization 3 milliLiter(s) Nebulizer every 4 hours  atorvastatin 10 milliGRAM(s) Oral at bedtime  benzonatate 100 milliGRAM(s) Oral every 8 hours  cefTRIAXone   IVPB 1000 milliGRAM(s) IV Intermittent every 24 hours  citalopram 40 milliGRAM(s) Oral <User Schedule>  docusate sodium 100 milliGRAM(s) Oral daily  enoxaparin Injectable 40 milliGRAM(s) SubCutaneous daily  guaiFENesin/dextromethorphan  Syrup 5 milliLiter(s) Oral every 8 hours  influenza   Vaccine 0.5 milliLiter(s) IntraMuscular once  pantoprazole    Tablet 40 milliGRAM(s) Oral before breakfast  senna 1 Tablet(s) Oral daily  tiotropium 18 MICROgram(s) Capsule 1 Capsule(s) Inhalation daily    MEDICATIONS  (PRN):  acetaminophen   Tablet .. 650 milliGRAM(s) Oral every 6 hours PRN Temp greater or equal to 38.5C (101.3F)  clonazePAM  Tablet 1 milliGRAM(s) Oral two times a day PRN anxiety      I&O's Summary    18 Oct 2019 07:01  -  19 Oct 2019 07:00  --------------------------------------------------------  IN: 290 mL / OUT: 0 mL / NET: 290 mL        Vital Signs Last 24 Hrs  T(C): 37.2 (19 Oct 2019 04:34), Max: 37.4 (18 Oct 2019 12:00)  T(F): 98.9 (19 Oct 2019 04:34), Max: 99.4 (18 Oct 2019 12:00)  HR: 93 (19 Oct 2019 04:34) (77 - 93)  BP: 120/74 (19 Oct 2019 04:34) (104/68 - 120/74)  BP(mean): --  RR: 18 (19 Oct 2019 04:34) (17 - 19)  SpO2: 92% (19 Oct 2019 04:34) (87% - 94%)    CAPILLARY BLOOD GLUCOSE    PHYSICAL EXAM:  GENERAL: NAD  HEAD:  Atraumatic, Normocephalic  EYES: EOMI, PERRL, conjunctiva and sclera clear  NECK: No JVD  CHEST/LUNG: coughed when asked to take deep breath. Rhonchi b/l. No wheezing or rales b/l.   HEART: Regular rate and rhythm; No murmurs, rubs, or gallops. Extremities warm and well perfused.   ABDOMEN: Soft, Nontender, Nondistended; Bowel sounds present  EXTREMITIES:  2+ Peripheral Pulses, No clubbing, cyanosis, or edema  PSYCH: AAOx3  NEUROLOGY: non-focal  SKIN: No rashes or lesions      LABS:                        9.5    5.09  )-----------( 304      ( 19 Oct 2019 10:28 )             28.4     Auto Eosinophil # x     / Auto Eosinophil % x     / Auto Neutrophil # x     / Auto Neutrophil % x     / BANDS % x                            8.9    5.22  )-----------( 254      ( 18 Oct 2019 08:57 )             26.9     Auto Eosinophil # x     / Auto Eosinophil % x     / Auto Neutrophil # x     / Auto Neutrophil % x     / BANDS % x        10-19    145  |  104  |  5<L>  ----------------------------<  98  3.9   |  27  |  0.60  10-18    142  |  101  |  7   ----------------------------<  125<H>  3.4<L>   |  27  |  0.62    Ca    8.4      19 Oct 2019 07:10  Mg     2.0     10-19  Phos  3.0     10-19  TPro  6.7  /  Alb  2.9<L>  /  TBili  0.6  /  DBili  x   /  AST  29  /  ALT  36  /  AlkPhos  80  10-19  TPro  6.5  /  Alb  2.9<L>  /  TBili  0.7  /  DBili  x   /  AST  34  /  ALT  33  /  AlkPhos  80  10-18                  RADIOLOGY & ADDITIONAL TESTS:    Imaging Personally Reviewed:    Consultant(s) Notes Reviewed:      Care Discussed with Consultants/Other Providers:

## 2019-10-19 NOTE — PROGRESS NOTE ADULT - PROBLEM SELECTOR PLAN 4
- resolved  - Admitted with sodium 134.  - likely hypovolemia 2/2 reduced po intake  - s/p 3.5L IVFs

## 2019-10-20 ENCOUNTER — TRANSCRIPTION ENCOUNTER (OUTPATIENT)
Age: 64
End: 2019-10-20

## 2019-10-20 VITALS
SYSTOLIC BLOOD PRESSURE: 103 MMHG | OXYGEN SATURATION: 90 % | HEART RATE: 83 BPM | TEMPERATURE: 98 F | DIASTOLIC BLOOD PRESSURE: 67 MMHG | RESPIRATION RATE: 18 BRPM

## 2019-10-20 LAB
ALBUMIN SERPL ELPH-MCNC: 3 G/DL — LOW (ref 3.3–5)
ALP SERPL-CCNC: 76 U/L — SIGNIFICANT CHANGE UP (ref 40–120)
ALT FLD-CCNC: 39 U/L — SIGNIFICANT CHANGE UP (ref 10–45)
ANION GAP SERPL CALC-SCNC: 8 MMOL/L — SIGNIFICANT CHANGE UP (ref 5–17)
AST SERPL-CCNC: 36 U/L — SIGNIFICANT CHANGE UP (ref 10–40)
BILIRUB SERPL-MCNC: 0.4 MG/DL — SIGNIFICANT CHANGE UP (ref 0.2–1.2)
BUN SERPL-MCNC: 6 MG/DL — LOW (ref 7–23)
CALCIUM SERPL-MCNC: 9.1 MG/DL — SIGNIFICANT CHANGE UP (ref 8.4–10.5)
CHLORIDE SERPL-SCNC: 102 MMOL/L — SIGNIFICANT CHANGE UP (ref 96–108)
CO2 SERPL-SCNC: 29 MMOL/L — SIGNIFICANT CHANGE UP (ref 22–31)
CREAT SERPL-MCNC: 0.6 MG/DL — SIGNIFICANT CHANGE UP (ref 0.5–1.3)
CULTURE RESULTS: SIGNIFICANT CHANGE UP
CULTURE RESULTS: SIGNIFICANT CHANGE UP
GLUCOSE SERPL-MCNC: 96 MG/DL — SIGNIFICANT CHANGE UP (ref 70–99)
HCT VFR BLD CALC: 28.2 % — LOW (ref 34.5–45)
HGB BLD-MCNC: 9.2 G/DL — LOW (ref 11.5–15.5)
MAGNESIUM SERPL-MCNC: 2.1 MG/DL — SIGNIFICANT CHANGE UP (ref 1.6–2.6)
MCHC RBC-ENTMCNC: 30.9 PG — SIGNIFICANT CHANGE UP (ref 27–34)
MCHC RBC-ENTMCNC: 32.6 GM/DL — SIGNIFICANT CHANGE UP (ref 32–36)
MCV RBC AUTO: 94.6 FL — SIGNIFICANT CHANGE UP (ref 80–100)
PHOSPHATE SERPL-MCNC: 3.5 MG/DL — SIGNIFICANT CHANGE UP (ref 2.5–4.5)
PLATELET # BLD AUTO: 359 K/UL — SIGNIFICANT CHANGE UP (ref 150–400)
POTASSIUM SERPL-MCNC: 3.9 MMOL/L — SIGNIFICANT CHANGE UP (ref 3.5–5.3)
POTASSIUM SERPL-SCNC: 3.9 MMOL/L — SIGNIFICANT CHANGE UP (ref 3.5–5.3)
PROT SERPL-MCNC: 6.3 G/DL — SIGNIFICANT CHANGE UP (ref 6–8.3)
RBC # BLD: 2.98 M/UL — LOW (ref 3.8–5.2)
RBC # FLD: 14.8 % — HIGH (ref 10.3–14.5)
SODIUM SERPL-SCNC: 139 MMOL/L — SIGNIFICANT CHANGE UP (ref 135–145)
SPECIMEN SOURCE: SIGNIFICANT CHANGE UP
SPECIMEN SOURCE: SIGNIFICANT CHANGE UP
WBC # BLD: 4.93 K/UL — SIGNIFICANT CHANGE UP (ref 3.8–10.5)
WBC # FLD AUTO: 4.93 K/UL — SIGNIFICANT CHANGE UP (ref 3.8–10.5)

## 2019-10-20 PROCEDURE — 71045 X-RAY EXAM CHEST 1 VIEW: CPT

## 2019-10-20 PROCEDURE — 87798 DETECT AGENT NOS DNA AMP: CPT

## 2019-10-20 PROCEDURE — 84100 ASSAY OF PHOSPHORUS: CPT

## 2019-10-20 PROCEDURE — 93005 ELECTROCARDIOGRAM TRACING: CPT

## 2019-10-20 PROCEDURE — 97161 PT EVAL LOW COMPLEX 20 MIN: CPT

## 2019-10-20 PROCEDURE — 82435 ASSAY OF BLOOD CHLORIDE: CPT

## 2019-10-20 PROCEDURE — 82728 ASSAY OF FERRITIN: CPT

## 2019-10-20 PROCEDURE — 96365 THER/PROPH/DIAG IV INF INIT: CPT

## 2019-10-20 PROCEDURE — 87449 NOS EACH ORGANISM AG IA: CPT

## 2019-10-20 PROCEDURE — 82947 ASSAY GLUCOSE BLOOD QUANT: CPT

## 2019-10-20 PROCEDURE — 87070 CULTURE OTHR SPECIMN AEROBIC: CPT

## 2019-10-20 PROCEDURE — 83550 IRON BINDING TEST: CPT

## 2019-10-20 PROCEDURE — 85610 PROTHROMBIN TIME: CPT

## 2019-10-20 PROCEDURE — 87486 CHLMYD PNEUM DNA AMP PROBE: CPT

## 2019-10-20 PROCEDURE — 84132 ASSAY OF SERUM POTASSIUM: CPT

## 2019-10-20 PROCEDURE — 99285 EMERGENCY DEPT VISIT HI MDM: CPT | Mod: 25

## 2019-10-20 PROCEDURE — 86803 HEPATITIS C AB TEST: CPT

## 2019-10-20 PROCEDURE — 96366 THER/PROPH/DIAG IV INF ADDON: CPT

## 2019-10-20 PROCEDURE — 87633 RESP VIRUS 12-25 TARGETS: CPT

## 2019-10-20 PROCEDURE — 85730 THROMBOPLASTIN TIME PARTIAL: CPT

## 2019-10-20 PROCEDURE — 80053 COMPREHEN METABOLIC PANEL: CPT

## 2019-10-20 PROCEDURE — 85027 COMPLETE CBC AUTOMATED: CPT

## 2019-10-20 PROCEDURE — 87581 M.PNEUMON DNA AMP PROBE: CPT

## 2019-10-20 PROCEDURE — 82803 BLOOD GASES ANY COMBINATION: CPT

## 2019-10-20 PROCEDURE — 94640 AIRWAY INHALATION TREATMENT: CPT

## 2019-10-20 PROCEDURE — 85014 HEMATOCRIT: CPT

## 2019-10-20 PROCEDURE — 82272 OCCULT BLD FECES 1-3 TESTS: CPT

## 2019-10-20 PROCEDURE — 99233 SBSQ HOSP IP/OBS HIGH 50: CPT

## 2019-10-20 PROCEDURE — 83735 ASSAY OF MAGNESIUM: CPT

## 2019-10-20 PROCEDURE — 83605 ASSAY OF LACTIC ACID: CPT

## 2019-10-20 PROCEDURE — 84295 ASSAY OF SERUM SODIUM: CPT

## 2019-10-20 PROCEDURE — 82330 ASSAY OF CALCIUM: CPT

## 2019-10-20 PROCEDURE — 83540 ASSAY OF IRON: CPT

## 2019-10-20 PROCEDURE — 87040 BLOOD CULTURE FOR BACTERIA: CPT

## 2019-10-20 PROCEDURE — 96368 THER/DIAG CONCURRENT INF: CPT

## 2019-10-20 PROCEDURE — 96361 HYDRATE IV INFUSION ADD-ON: CPT

## 2019-10-20 PROCEDURE — 81001 URINALYSIS AUTO W/SCOPE: CPT

## 2019-10-20 PROCEDURE — 87086 URINE CULTURE/COLONY COUNT: CPT

## 2019-10-20 RX ORDER — IPRATROPIUM/ALBUTEROL SULFATE 18-103MCG
3 AEROSOL WITH ADAPTER (GRAM) INHALATION
Qty: 1000 | Refills: 0
Start: 2019-10-20 | End: 2019-11-18

## 2019-10-20 RX ADMIN — Medication 100 MILLIGRAM(S): at 05:30

## 2019-10-20 RX ADMIN — CITALOPRAM 40 MILLIGRAM(S): 10 TABLET, FILM COATED ORAL at 08:50

## 2019-10-20 RX ADMIN — PANTOPRAZOLE SODIUM 40 MILLIGRAM(S): 20 TABLET, DELAYED RELEASE ORAL at 05:30

## 2019-10-20 RX ADMIN — ENOXAPARIN SODIUM 40 MILLIGRAM(S): 100 INJECTION SUBCUTANEOUS at 12:13

## 2019-10-20 RX ADMIN — Medication 5 MILLILITER(S): at 05:30

## 2019-10-20 NOTE — DISCHARGE NOTE PROVIDER - CARE PROVIDER_API CALL
Sukhjinder Matthews (MD)  Medicine  [address] [facility][1], [address] [place] [id number]  Phone: [phone]  Fax: [phone]  Follow Up Time: 2 weeks

## 2019-10-20 NOTE — PROGRESS NOTE ADULT - ATTENDING COMMENTS
Patient seen and examined.  Meds, labs and vitals all reviewed.  Patient with CAP, clinically much improved.  Not wearing oxygen. Ambulating without incident. Sepsis resolved.  Patient to be ambulated, and O2 sat checked.   If O2 sats are within acceptable range, can likely be discharged home on PO antibiotics to complete course.
Patient seen and examined.  Meds, labs and vitals all reviewed.  Patient with CAP, clinically much improved.  Patient not requiring oxygen overnight.  Feels much improved.  Nebulizers switched to prn. Not requiring.  Encephalopathy resolved.  Patient to be discharged home with outpatient PMD follow up to complete 7 days of ABX, on Levaquin.
Patient seen and examined today. I agree with the above findings, assessment, and plan with the following additions and exceptions:     Continue NC. Wean as tolerated. Continue IS and OOBTC daily with assistance.  Urine legionella pending. Pt can produce sputum for culture.   Continue midodrine. Will titrate off tomorrow.   PGY-1 administered Lasix 40 mg ivp. However this was not discussed with me. Repeat vitals:  /76 with HR 73. Exam unchanged from prior. Patient notified of error.   Patient to be monitored closely for any adverse reactions. Covering teams to monitor as well.   Low threshold for ICU consult.   Rest of plan as above    Dr. Cleve White, DO  Division of Hospital Medicine  [facility]  Pager:  [phone]
Patient seen and examined today. I agree with the above findings, assessment, and plan with the following additions and exceptions:     Continue NC. Wean as tolerated. Continue IS and OOBTC daily with assistance.  Urine legionella pending.   Titrating midodrine off.   Rest of plan as above    Dr. Cleve White, DO  Division of Hospital Medicine  [facility]  Pager:  [phone].
Patient seen and examined today. I agree with the above findings, assessment, and plan with the following additions and exceptions:     Continue NC. Wean as tolerated. Continue IS and OOBTC daily with assistance.  Urine legionella negative. RVP negative. D/C azithro.   Continue CTX 1g ivpb qd.   Midodrine off.   Anticipate d/c Sunday if oxygenation improves.   Rest of plan as above    Dr. Cleve White, DO  Division of Hospital Medicine  [facility]  Pager:  [phone].

## 2019-10-20 NOTE — PROGRESS NOTE ADULT - PROBLEM SELECTOR PLAN 7
- continue with Celexa and Klonopin  Istop Reference #: [id number]
- continue with Celexa and Klonopin  Istop Reference #: [id number]
- continue with Celexa and Klonopin  Istop Reference #: [id number]
- continue with Celexa and Klonopin  Istop Reference #: [id number]
- continue with Celexa and Klonopin  Istop Reference #: [id number]

## 2019-10-20 NOTE — PROGRESS NOTE ADULT - PROBLEM SELECTOR PLAN 5
[facility] Reference #: [id number]  - klonopin prn dosing of 1 mg q12h
[facility] Reference #: [id number]  - klonopin prn dosing of 1 mg q12h
[facility] Reference #: [id number]  - klonopin prn dosing of 1 mg q12h
Patient report discrepancies with klonopin dosing based on [facility] records.   - clarify with  in AM  - will treat with prn dosing of 1 mg q12h
Patient report discrepancies with klonopin dosing based on [facility] records.   - clarify with  in AM  - will treat with prn dosing of 1 mg q12h

## 2019-10-20 NOTE — PROGRESS NOTE ADULT - SUBJECTIVE AND OBJECTIVE BOX
Johnnie Ross MD  PGY 1 Department of Internal Medicine  Pager: [phone]    Patient is a 64y old  Female who presents with a chief complaint of altered mental status (19 Oct 2019 11:38)    SUBJECTIVE / OVERNIGHT EVENTS: Pt seen and examined. No acute overnight events.    CONSTITUTIONAL:  No weakness, fevers, or chills  HEENT:  Eyes:  No visual changes. Ears, Nose, Throat:  No vertigo or throat pain  NECK: No pain or stiffness  SKIN:  No rash or itching.  CARDIOVASCULAR:  No chest pain, chest pressure or chest discomfort. No palpitations.  RESPIRATORY:  No shortness of breath, cough or sputum or hemoptysis.  GASTROINTESTINAL:  No abdominal pain, N/V, or constipation/diarrhea  GENITOURINARY:  Denies hematuria, dysuria.   NEUROLOGICAL:  No numbness or weakness  MUSCULOSKELETAL:  No muscle, back pain, joint pain or stiffness.  All other review of systems is negative unless indicated above      MEDICATIONS  (STANDING):  ALBUTerol    90 MICROgram(s) HFA Inhaler 1 Puff(s) Inhalation every 4 hours  atorvastatin 10 milliGRAM(s) Oral at bedtime  benzonatate 100 milliGRAM(s) Oral every 8 hours  cefTRIAXone   IVPB 1000 milliGRAM(s) IV Intermittent every 24 hours  citalopram 40 milliGRAM(s) Oral <User Schedule>  docusate sodium 100 milliGRAM(s) Oral daily  enoxaparin Injectable 40 milliGRAM(s) SubCutaneous daily  guaiFENesin/dextromethorphan  Syrup 5 milliLiter(s) Oral every 8 hours  influenza   Vaccine 0.5 milliLiter(s) IntraMuscular once  pantoprazole    Tablet 40 milliGRAM(s) Oral before breakfast  senna 1 Tablet(s) Oral daily  tiotropium 18 MICROgram(s) Capsule 1 Capsule(s) Inhalation daily    MEDICATIONS  (PRN):  acetaminophen   Tablet .. 650 milliGRAM(s) Oral every 6 hours PRN Temp greater or equal to 38.5C (101.3F)  ALBUTerol/ipratropium for Nebulization. 3 milliLiter(s) Nebulizer every 4 hours PRN Shortness of Breath  clonazePAM  Tablet 1 milliGRAM(s) Oral two times a day PRN anxiety      I&O's Summary    19 Oct 2019 07:01  -  20 Oct 2019 07:00  --------------------------------------------------------  IN: 360 mL / OUT: 0 mL / NET: 360 mL        Vital Signs Last 24 Hrs  T(C): 37.1 (20 Oct 2019 04:27), Max: 37.1 (20 Oct 2019 04:27)  T(F): 98.8 (20 Oct 2019 04:27), Max: 98.8 (20 Oct 2019 04:27)  HR: 81 (20 Oct 2019 04:27) (81 - 91)  BP: 126/70 (20 Oct 2019 04:27) (116/60 - 126/70)  BP(mean): --  RR: 18 (20 Oct 2019 04:27) (18 - 20)  SpO2: 94% (20 Oct 2019 04:27) (90% - 95%)    CAPILLARY BLOOD GLUCOSE      PHYSICAL EXAM:  GENERAL: NAD  HEAD:  Atraumatic, Normocephalic  EYES: EOMI, PERRL, conjunctiva and sclera clear  NECK: No JVD  CHEST/LUNG: coughed when asked to take deep breath. Rhonchi b/l. No wheezing or rales b/l.   HEART: Regular rate and rhythm; No murmurs, rubs, or gallops. Extremities warm and well perfused.   ABDOMEN: Soft, Nontender, Nondistended; Bowel sounds present  EXTREMITIES:  2+ Peripheral Pulses, No clubbing, cyanosis, or edema  PSYCH: AAOx3  NEUROLOGY: non-focal  SKIN: No rashes or lesions        LABS:                        9.5    5.09  )-----------( 304      ( 19 Oct 2019 10:28 )             28.4     Auto Eosinophil # x     / Auto Eosinophil % x     / Auto Neutrophil # x     / Auto Neutrophil % x     / BANDS % x                            8.9    5.22  )-----------( 254      ( 18 Oct 2019 08:57 )             26.9     Auto Eosinophil # x     / Auto Eosinophil % x     / Auto Neutrophil # x     / Auto Neutrophil % x     / BANDS % x        10-20    139  |  102  |  6<L>  ----------------------------<  96  3.9   |  29  |  0.60  10-19    145  |  104  |  5<L>  ----------------------------<  98  3.9   |  27  |  0.60    Ca    9.1      20 Oct 2019 06:15  Mg     2.1     10-20  Phos  3.5     10-20  TPro  6.3  /  Alb  3.0<L>  /  TBili  0.4  /  DBili  x   /  AST  36  /  ALT  39  /  AlkPhos  76  10-20  TPro  6.7  /  Alb  2.9<L>  /  TBili  0.6  /  DBili  x   /  AST  29  /  ALT  36  /  AlkPhos  80  10-19                  RADIOLOGY & ADDITIONAL TESTS:    Imaging Personally Reviewed:    Consultant(s) Notes Reviewed:      Care Discussed with Consultants/Other Providers:

## 2019-10-20 NOTE — DISCHARGE NOTE PROVIDER - NSDCCPCAREPLAN_GEN_ALL_CORE_FT
PRINCIPAL DISCHARGE DIAGNOSIS  Diagnosis: Pneumonia of right middle lobe due to infectious organism  Assessment and Plan of Treatment: You came in with shortness of breath and a bit of confusion due to a bacterial infection in your lungs. We treated the infection with the appropriate antibiotics and you improved. Please followup with Dr. Matthews within 1-2 weeks of discharge. Any new or worsening of symptoms please call your doctor or go to the ED. PRINCIPAL DISCHARGE DIAGNOSIS  Diagnosis: Pneumonia  Assessment and Plan of Treatment: You came in with shortness of breath and confusion due to a bacterial infection in your lungs. We treated the infection with appropriate antibiotics and your symptoms improved. Please followup with Dr. Matthews within 1-2 weeks of discharge. Any new or worsening symptoms please call your docotr or go to the ED. PRINCIPAL DISCHARGE DIAGNOSIS  Diagnosis: Pneumonia  Assessment and Plan of Treatment: You came in with shortness of breath and confusion due to a bacterial infection in your lungs. We treated the infection with appropriate antibiotics and your symptoms improved. Please followup with Dr. Matthews within 1-2 weeks of discharge. Any new or worsening symptoms please call your docotr or go to the ED.      SECONDARY DISCHARGE DIAGNOSES  Diagnosis: Respiratory distress  Assessment and Plan of Treatment:     Diagnosis: AMS (altered mental status)  Assessment and Plan of Treatment: PRINCIPAL DISCHARGE DIAGNOSIS  Diagnosis: Pneumonia  Assessment and Plan of Treatment: You came in with shortness of breath and confusion due to a bacterial infection in your lungs. We treated the infection with appropriate antibiotics and your symptoms improved. Please followup with Dr. Matthews within 1-2 weeks of discharge. Any new or worsening symptoms please call your docotr or go to the ED.      SECONDARY DISCHARGE DIAGNOSES  Diagnosis: Respiratory distress  Assessment and Plan of Treatment: You also came in with respiratory distress, we gave you albuterol treatment to help you breath and oxygen and your oxygenation improved. Please continue use of incentive spirometry and only if needed, the albuterol treatment.    Diagnosis: AMS (altered mental status)  Assessment and Plan of Treatment: PRINCIPAL DISCHARGE DIAGNOSIS  Diagnosis: Pneumonia  Assessment and Plan of Treatment: You came in with shortness of breath and confusion due to a bacterial infection in your lungs. We treated the infection with appropriate antibiotics and your symptoms improved. Please followup with Dr. Matthews within 1-2 weeks of discharge. Any new or worsening symptoms please call your docotr or go to the ED.      SECONDARY DISCHARGE DIAGNOSES  Diagnosis: Respiratory distress  Assessment and Plan of Treatment: You also came in with respiratory distress, we gave you albuterol treatment to help you breath and oxygen and your oxygenation improved. Please continue use of incentive spirometry and only if needed, the albuterol treatment.    Diagnosis: AMS (altered mental status)  Assessment and Plan of Treatment: You came in a little confused, from the infection. We treated the infection with appropriate antibiotics and your symptoms improved. Please followup with Dr. Matthews within 1-2 weeks of discharge. Any new or worsening symptoms please call your docotr or go to the ED.

## 2019-10-20 NOTE — PROGRESS NOTE ADULT - PROBLEM SELECTOR PROBLEM 2
Pneumonia of left lower lobe due to infectious organism

## 2019-10-20 NOTE — PROGRESS NOTE ADULT - PROBLEM SELECTOR PROBLEM 3
Acute metabolic encephalopathy

## 2019-10-20 NOTE — PROGRESS NOTE ADULT - PROBLEM SELECTOR PLAN 8
- continue with lipitor 10 mg

## 2019-10-20 NOTE — DISCHARGE NOTE NURSING/CASE MANAGEMENT/SOCIAL WORK - PATIENT PORTAL LINK FT
You can access the FollowMyHealth Patient Portal offered by [facility] by registering at the following website: [email or web address]. By joining [employer]’s FollowMyHealth portal, you will also be able to view your health information using other applications (apps) compatible with our system.

## 2019-10-20 NOTE — PROGRESS NOTE ADULT - REASON FOR ADMISSION
altered mental status

## 2019-10-20 NOTE — PROGRESS NOTE ADULT - PROBLEM SELECTOR PLAN 3
A&Ox3 at baseline. Currently A&Ox3  - likely 2/2 sepsis due to pneumonia vs home benzodiazepines  - patient is not on hycodan (clarified w/ patient, does not appear to be a home medicine)  - treat sepsis and pneumonia   - continue to monitor

## 2019-10-20 NOTE — DISCHARGE NOTE PROVIDER - HOSPITAL COURSE
64 year old female with history of essential tremors s/p nerve stimulator presents with AMS  2/2 pneumonia w/ severe sepsis admitted for further management. 64 year old female with history of essential tremors s/p nerve stimulator presents with AMS 2/2 pneumonia w/ severe sepsis admitted for further management. Patient s/p 3.4L fluids, MICU     consulted who deemed her to be severe sepsis, started midodrine and transfer to floors. 64 year old female with history of essential tremors s/p nerve stimulator presents with AMS 2/2 pneumonia w/ severe sepsis admitted for further management. Patient s/p 3.4L fluids in ED, MICU consulted, who started midodrine and transferred to floors. Patient on azithromycin and ceftriaxone daily, CXR suggestive of PNA, UA negative, Ucx negative. 64F with history of essential tremors s/p nerve stimulator presents with AMS and SOB 2/2 pneumonia w/ severe sepsis admitted for further management. Patient s/p 3.4L fluids in ED, MICU consulted, who started midodrine and transferred to floors. Patient on azithromycin and ceftriaxone daily, CXR suggestive of PNA, UA negative, Ucx negative. Patient was also started on duonebs and oxygen for oxygenation support and weaned off as tolerated. Bcx negative. Patient is medically stable for discharge. 64F with history of essential tremors s/p nerve stimulator presents with AMS and SOB 2/2 pneumonia w/ severe sepsis admitted for further management. Patient s/p 3.4L fluids in ED, MICU consulted, who started midodrine and transferred to floors. Patient started on azithromycin and ceftriaxone daily, CXR suggestive of PNA, UA negative, Ucx negative, urine legionella negative. Patient was also started on duonebs and oxygen for oxygenation support and weaned off as tolerated. Bcx negative. Patient is medically stable for discharge.

## 2019-10-20 NOTE — PROGRESS NOTE ADULT - PROBLEM SELECTOR PROBLEM 6
Anemia, unspecified type

## 2019-12-31 ENCOUNTER — APPOINTMENT (OUTPATIENT)
Dept: NEUROLOGY | Facility: CLINIC | Age: 64
End: 2019-12-31
Payer: COMMERCIAL

## 2019-12-31 VITALS
DIASTOLIC BLOOD PRESSURE: 74 MMHG | WEIGHT: 162 LBS | HEIGHT: 64 IN | SYSTOLIC BLOOD PRESSURE: 119 MMHG | HEART RATE: 70 BPM | BODY MASS INDEX: 27.66 KG/M2

## 2019-12-31 PROBLEM — G25.0 ESSENTIAL TREMOR: Chronic | Status: ACTIVE | Noted: 2017-02-24

## 2019-12-31 PROCEDURE — 95970 ALYS NPGT W/O PRGRMG: CPT

## 2019-12-31 PROCEDURE — 99214 OFFICE O/P EST MOD 30 MIN: CPT | Mod: 25

## 2019-12-31 NOTE — PROCEDURE
[FreeTextEntry1] : L VIM\par Activa SC, implanted 3/9/17 , 100% on\par  C+1- 2.1V PW 60, rate 130. \par Battery 2.97V\par \par R VIM\par Activa SC, implanted 3/23/17. 99% on\par C+ 1- 2.0V PW 60, rate 130\par Battery 2.96V\par \par Turned both off, no change in voice. Final setting same as initial \par

## 2019-12-31 NOTE — DISCUSSION/SUMMARY
[FreeTextEntry1] : 65 yo F w/ long-standing h/o ET, doing relatively well, but fatigue and anxiety and imbalance (all of which can relate to ET) affect work. \par \par 1. DBS checked, no changes made today (as tremor per se is under control), no change in voice or balance when we turned it off.  Balance problem is likely ET-related in my judgement (given that it affects tandem walk, and how long she has had it.\par 2. Continue anxiety medication, and follow with psychiatrist\par 3. PT, cane.\par

## 2019-12-31 NOTE — HISTORY OF PRESENT ILLNESS
[FreeTextEntry1] : 63 yo F w/ long-standing h/o ET presenting for follow up.  \par \par 1. DBS in March 2017, bilateral VIM, last adjustment in July 2017. Voice feels more "breathy".\par 2. Off inderal. Still on celexa 40 mg, mood improved after residential, seeing a psychiatrist/psychologist,  and clonazepam.\par 3. Took early residential, and is moving to [facility]. \par 4. Broke wrist at work, and balance is worse. Has fallen since then as well.  Has some "electrical shocks into left leg" when lying down, but no back pain\par

## 2019-12-31 NOTE — PHYSICAL EXAM
[General Appearance - In No Acute Distress] : in no acute distress [General Appearance - Alert] : alert [Impaired Insight] : insight and judgment were intact [Oriented To Time, Place, And Person] : oriented to person, place, and time [General Appearance - Well Developed] : well developed [Affect] : the affect was normal [Time] : oriented to time [Person] : oriented to person [Place] : oriented to place [Cranial Nerves Facial (VII)] : face symmetrical [Cranial Nerves Oculomotor (III)] : extraocular motion intact [Cranial Nerves Vestibulocochlear (VIII)] : hearing was intact bilaterally [Cranial Nerves Hypoglossal (XII)] : there was no tongue deviation with protrusion [Motor Handedness Right-Handed] : the patient is right hand dominant [Cranial Nerves Accessory (XI - Cranial And Spinal)] : head turning and shoulder shrug symmetric [FreeTextEntry4] : fluent speech, follows commands well [FreeTextEntry1] : No head tremor today. no voice tremor. Minimal bilateral tremor with spiral drawing . Minimal bilateral thumb tremor at rest and left leg tremor. No wrist rigidity. No bradykinesia during hand , finger taps, and foot stomps. Able to rise from a chair without hands. Posture is erect. \par \par Gait is of normal pace, stride, and arm swing, minimal left foot inturning. No ataxia. Tandem walk impaired. Pull test negative\par \par Turned DBS off, no change in walking and balance.

## 2020-05-24 NOTE — DISCHARGE NOTE ADULT - NS AS DC FOLLOWUP STROKE INST
The patient is a 89y Female complaining of urinary symptoms. Stroke (includes: TIA/SAH/ICH/Ischemic Stroke)

## 2020-07-18 ENCOUNTER — TRANSCRIPTION ENCOUNTER (OUTPATIENT)
Age: 65
End: 2020-07-18

## 2020-07-24 ENCOUNTER — APPOINTMENT (OUTPATIENT)
Dept: NEUROLOGY | Facility: CLINIC | Age: 65
End: 2020-07-24

## 2020-07-24 ENCOUNTER — APPOINTMENT (OUTPATIENT)
Dept: NEUROLOGY | Facility: CLINIC | Age: 65
End: 2020-07-24
Payer: COMMERCIAL

## 2020-07-24 DIAGNOSIS — F41.8 OTHER SPECIFIED ANXIETY DISORDERS: ICD-10-CM

## 2020-07-24 DIAGNOSIS — G25.0 ESSENTIAL TREMOR: ICD-10-CM

## 2020-07-24 DIAGNOSIS — Z96.89 PRESENCE OF OTHER SPECIFIED FUNCTIONAL IMPLANTS: ICD-10-CM

## 2020-07-24 PROCEDURE — 99214 OFFICE O/P EST MOD 30 MIN: CPT | Mod: 95

## 2020-07-24 NOTE — HISTORY OF PRESENT ILLNESS
[Home] : at home, [unfilled] , at the time of the visit. [Medical Office: ([facility])___] : at the medical office located in  [Verbal consent obtained from patient] : the patient, [unfilled] [FreeTextEntry1] : 63 yo F w/ long-standing h/o ET presenting for follow up.  \par \par 1. DBS in March 2017, bilateral VIM, last adjustment in July 2017. Voice feels more "breathy".\par 2. Off inderal. Still on celexa 40 mg, mood improved after [facility], seeing a psychiatrist/psychologist,  and clonazepam.\par 3. Took early [facility]P, and is moving to [facility]. \par 4. Broke wrist at work, and balance is worse. Has fallen since then as well.  Has some "electrical shocks into left leg" when lying down, but no back pain\par

## 2020-07-24 NOTE — REVIEW OF SYSTEMS
[Feeling Poorly] : not feeling poorly [Confused or Disoriented] : no confusion [Poor Coordination] : good coordination [Difficulty Walking] : no difficulty walking [Frequent Falls] : not falling [Depression] : no depression

## 2020-07-24 NOTE — HISTORY OF PRESENT ILLNESS
[Home] : at home, [unfilled] , at the time of the visit. [Medical Office: ([facility])___] : at the medical office located in  [Verbal consent obtained from patient] : the patient, [unfilled] [FreeTextEntry1] : 65 yo F w/ long-standing h/o ET presenting for follow up.  \par \par 1. DBS in March 2017, bilateral VIM, last adjustment in July 2017. Voice feels more "breathy".\par 2. Off inderal. Still on celexa 40 mg, mood improved after [facility], seeing a psychiatrist/psychologist,  and clonazepam.\par 3. Took early [facility], and is moving to [facility]. \par 4. Broke wrist at work, and balance is worse. Has fallen since then as well.  Has some "electrical shocks into left leg" when lying down, but no back pain\par

## 2020-07-24 NOTE — END OF VISIT
[>50% of Time Spent on Counseling and Coordination of Care for  ___] : Greater than 50% of the encounter time was spent on counseling and coordination of care for [unfilled] [>50% of the face to face encounter time was spent on counseling and/or coordination of care for ___] : Greater than 50% of the face to face encounter time was spent on counseling and/or coordination of care for [unfilled] [Time Spent: ___ minutes] : I have spent [unfilled] minutes of time on the encounter.

## 2020-07-24 NOTE — PHYSICAL EXAM
[General Appearance - Alert] : alert [General Appearance - In No Acute Distress] : in no acute distress [Oriented To Time, Place, And Person] : oriented to person, place, and time [General Appearance - Well Developed] : well developed [Impaired Insight] : insight and judgment were intact [Affect] : the affect was normal [Person] : oriented to person [Time] : oriented to time [Place] : oriented to place [Cranial Nerves Oculomotor (III)] : extraocular motion intact [Cranial Nerves Accessory (XI - Cranial And Spinal)] : head turning and shoulder shrug symmetric [Cranial Nerves Vestibulocochlear (VIII)] : hearing was intact bilaterally [Cranial Nerves Facial (VII)] : face symmetrical [Motor Handedness Right-Handed] : the patient is right hand dominant [Cranial Nerves Hypoglossal (XII)] : there was no tongue deviation with protrusion [FreeTextEntry4] : fluent speech, follows commands well [FreeTextEntry1] : No head tremor today. no voice tremor. Minimal bilateral tremor with spiral drawing . Minimal bilateral thumb tremor at rest and left leg tremor. No wrist rigidity. No bradykinesia during hand , finger taps, and foot stomps. Able to rise from a chair without hands. Posture is erect. \par \par Gait is of normal pace, stride, and arm swing, minimal left foot inturning. No ataxia. Tandem walk impaired. Pull test negative\par \par Turned DBS off, no change in walking and balance.

## 2020-07-24 NOTE — PHYSICAL EXAM
[General Appearance - In No Acute Distress] : in no acute distress [General Appearance - Alert] : alert [Impaired Insight] : insight and judgment were intact [General Appearance - Well Developed] : well developed [Oriented To Time, Place, And Person] : oriented to person, place, and time [Affect] : the affect was normal [Person] : oriented to person [Cranial Nerves Oculomotor (III)] : extraocular motion intact [Place] : oriented to place [Time] : oriented to time [Cranial Nerves Facial (VII)] : face symmetrical [Cranial Nerves Accessory (XI - Cranial And Spinal)] : head turning and shoulder shrug symmetric [Cranial Nerves Vestibulocochlear (VIII)] : hearing was intact bilaterally [Motor Handedness Right-Handed] : the patient is right hand dominant [Cranial Nerves Hypoglossal (XII)] : there was no tongue deviation with protrusion [FreeTextEntry4] : fluent speech, follows commands well [FreeTextEntry1] : No head tremor today. no voice tremor. Minimal bilateral tremor with spiral drawing . Minimal bilateral thumb tremor at rest and left leg tremor. No wrist rigidity. No bradykinesia during hand , finger taps, and foot stomps. Able to rise from a chair without hands. Posture is erect. \par \par Gait is of normal pace, stride, and arm swing, minimal left foot inturning. No ataxia. Tandem walk impaired. Pull test negative\par \par Turned DBS off, no change in walking and balance.

## 2021-01-07 ENCOUNTER — NEW PATIENT COMPREHENSIVE (OUTPATIENT)
Dept: URBAN - METROPOLITAN AREA CLINIC 35 | Facility: CLINIC | Age: 66
End: 2021-01-07

## 2021-01-07 DIAGNOSIS — H43.393: ICD-10-CM

## 2021-01-07 DIAGNOSIS — H52.7: ICD-10-CM

## 2021-01-07 DIAGNOSIS — H25.813: ICD-10-CM

## 2021-01-07 DIAGNOSIS — H04.123: ICD-10-CM

## 2021-01-07 PROCEDURE — 92004 COMPRE OPH EXAM NEW PT 1/>: CPT

## 2021-01-07 PROCEDURE — 92015 DETERMINE REFRACTIVE STATE: CPT

## 2021-01-07 ASSESSMENT — VISUAL ACUITY
OD_CC: J1
OD_CC: 20/25
OS_CC: J1
OS_SC: J12
OD_SC: J12
OS_SC: 20/60
OS_CC: 20/25
OD_SC: 20/100

## 2021-01-07 ASSESSMENT — TONOMETRY
OS_IOP_MMHG: 16
OD_IOP_MMHG: 15

## 2021-02-16 NOTE — PROCEDURE NOTE: CLINICAL
PROCEDURE NOTE: Epilation #7 Left Lower Lid. Diagnosis: Trichiasis without Entropion. Anesthesia: Topical. Prior to treatment, the risks/benefits/alternatives were discussed. The patient wished to proceed with procedure. Aberrant lashes removed from * lid(s) using microforcep. Patient tolerated procedure well. There were no complications. Post-op instructions given. Sung White

## 2021-02-17 NOTE — PROCEDURE NOTE: CLINICAL
PROCEDURE NOTE: Entropion Repair, Suture Left Lower Lid. Diagnosis: Trichiasis without Entropion. Anesthesia: Local. Risks, benefits and alternatives were discussed. Patient desires to proceed with entropion repair using dissolvable sutures today. A drop of proparacaine was instilled into the involved eye. A drop of tetracaine was placed on a cotton tipped applicator and applied to the inferior conjunctiva. The eyelid was anesthetized using 1% lidocaine with epi. Two individual 5-0 chromic gut sutures were placed in the lower eyelid to nila the eyelid into the correct position. Erythromycin ophthalmic ointment was placed on the sutures. The patient tolerated the procedure well and left in good condition. Raz Nunes

## 2021-03-23 NOTE — PATIENT DISCUSSION
Recommended LLL Entropion repair in [address] with Dr. Supriya Hoffman for permanent relief in future.

## 2021-04-08 NOTE — H&P PST ADULT - NS HIV RISK FACTOR NO
CXR negative - No pneumothorax, No opacities, No free airCXR negative - No infiltrates, No consolidation, No atelectasis seen
No, Declined

## 2021-05-12 NOTE — PATIENT PROFILE ADULT. - NS TRANSFER EYEGLASSES PAIRS
IRENE TAYLOR    [id number]    74y      Female    INTERVAL HPI/OVERNIGHT EVENTS: patient beign seen for cva.     REVIEW OF SYSTEMS:    CONSTITUTIONAL: No fever, weight loss, or fatigue  RESPIRATORY: No cough, wheezing, hemoptysis; No shortness of breath  CARDIOVASCULAR: No chest pain, palpitations  GASTROINTESTINAL: No abdominal or epigastric pain. No nausea, vomiting  NEUROLOGICAL: No headaches, memory loss, loss of strength.  MISCELLANEOUS:      Vital Signs Last 24 Hrs  T(C): 37.1 (11 May 2021 17:48), Max: 37.1 (11 May 2021 17:48)  T(F): 98.7 (11 May 2021 17:48), Max: 98.7 (11 May 2021 17:48)  HR: 107 (12 May 2021 07:20) (97 - 107)  BP: 98/57 (12 May 2021 07:20) (98/57 - 110/79)  BP(mean): --  RR: 18 (11 May 2021 17:48) (18 - 18)  SpO2: 97% (11 May 2021 17:48) (97% - 97%)    PHYSICAL EXAM:    GENERAL: NAD, well-groomed  HEENT: PERRL, +EOMI  NECK: soft, Supple, No JVD,   CHEST/LUNG: Clear to auscultation bilaterally; No wheezing  HEART: S1S2+, Regular rate and rhythm; No murmurs, rubs, or gallops  ABDOMEN: Soft, Nontender, Nondistended; Bowel sounds present  EXTREMITIES:  2+ Peripheral Pulses, No clubbing, cyanosis, or edema  SKIN: No rashes or lesions  NEURO: AAOX3, no focal deficits, no motor r sensory loss  PSYCH: normal mood      LABS:        MEDICATIONS  (STANDING):  aspirin  chewable 81 milliGRAM(s) Oral daily  atorvastatin 40 milliGRAM(s) Oral at bedtime  chlorhexidine 4% Liquid 1 Application(s) Topical <User Schedule>  dextrose 40% Gel 15 Gram(s) Oral once  dextrose 5%. 1000 milliLiter(s) (50 mL/Hr) IV Continuous <Continuous>  dextrose 5%. 1000 milliLiter(s) (100 mL/Hr) IV Continuous <Continuous>  dextrose 50% Injectable 25 Gram(s) IV Push once  dextrose 50% Injectable 12.5 Gram(s) IV Push once  epoetin analilia-epbx (RETACRIT) Injectable 18028 Unit(s) IV Push <User Schedule>  glucagon  Injectable 1 milliGRAM(s) IntraMuscular once  insulin lispro (ADMELOG) corrective regimen sliding scale   SubCutaneous every 6 hours  melatonin 5 milliGRAM(s) Oral at bedtime  metoprolol tartrate 12.5 milliGRAM(s) Oral every 12 hours  midodrine. 2.5 milliGRAM(s) Oral three times a day  Nephro-debbie 1 Tablet(s) Oral daily  pantoprazole  Injectable 40 milliGRAM(s) IV Push daily  polyethylene glycol 3350 17 Gram(s) Oral daily  senna 2 Tablet(s) Oral at bedtime  valproate sodium IVPB 250 milliGRAM(s) IV Intermittent every 8 hours    MEDICATIONS  (PRN):  acetaminophen    Suspension .. 650 milliGRAM(s) Oral every 6 hours PRN Temp greater or equal to 38C (100.4F), Mild Pain (1 - 3)  LORazepam   Injectable 0.25 milliGRAM(s) IV Push every 6 hours PRN Agitation  sodium chloride 0.9% lock flush 10 milliLiter(s) IV Push every 1 hour PRN Pre/post blood products, medications, blood draw, and to maintain line patency      RADIOLOGY & ADDITIONAL TESTS:   IRENE TAYLOR    [id number]    74y      Female    INTERVAL HPI/OVERNIGHT EVENTS: patient beign seen for cva.     REVIEW OF SYSTEMS:  unable to obtain     Vital Signs Last 24 Hrs  T(C): 37.1 (11 May 2021 17:48), Max: 37.1 (11 May 2021 17:48)  T(F): 98.7 (11 May 2021 17:48), Max: 98.7 (11 May 2021 17:48)  HR: 107 (12 May 2021 07:20) (97 - 107)  BP: 98/57 (12 May 2021 07:20) (98/57 - 110/79)  BP(mean): --  RR: 18 (11 May 2021 17:48) (18 - 18)  SpO2: 97% (11 May 2021 17:48) (97% - 97%)    PHYSICAL EXAM:    GENERAL: NAD  HEAD:  Atraumatic, Normocephalic  NECK: Supple, No JVD, Normal thyroid  NERVOUS SYSTEM:  Alert, moving all extremities  CHEST/LUNG: Clear to auscultation bilaterally  HEART: Regular rate and rhythm; No murmurs, rubs, or gallops  ABDOMEN: Soft, Nontender, Nondistended; Bowel sounds present  EXTREMITIES:  2+ Peripheral Pulses, No clubbing, cyanosis, or edema        LABS:        MEDICATIONS  (STANDING):  aspirin  chewable 81 milliGRAM(s) Oral daily  atorvastatin 40 milliGRAM(s) Oral at bedtime  chlorhexidine 4% Liquid 1 Application(s) Topical <User Schedule>  dextrose 40% Gel 15 Gram(s) Oral once  dextrose 5%. 1000 milliLiter(s) (50 mL/Hr) IV Continuous <Continuous>  dextrose 5%. 1000 milliLiter(s) (100 mL/Hr) IV Continuous <Continuous>  dextrose 50% Injectable 25 Gram(s) IV Push once  dextrose 50% Injectable 12.5 Gram(s) IV Push once  epoetin analilia-epbx (RETACRIT) Injectable 12842 Unit(s) IV Push <User Schedule>  glucagon  Injectable 1 milliGRAM(s) IntraMuscular once  insulin lispro (ADMELOG) corrective regimen sliding scale   SubCutaneous every 6 hours  melatonin 5 milliGRAM(s) Oral at bedtime  metoprolol tartrate 12.5 milliGRAM(s) Oral every 12 hours  midodrine. 2.5 milliGRAM(s) Oral three times a day  Nephro-debbie 1 Tablet(s) Oral daily  pantoprazole  Injectable 40 milliGRAM(s) IV Push daily  polyethylene glycol 3350 17 Gram(s) Oral daily  senna 2 Tablet(s) Oral at bedtime  valproate sodium IVPB 250 milliGRAM(s) IV Intermittent every 8 hours    MEDICATIONS  (PRN):  acetaminophen    Suspension .. 650 milliGRAM(s) Oral every 6 hours PRN Temp greater or equal to 38C (100.4F), Mild Pain (1 - 3)  LORazepam   Injectable 0.25 milliGRAM(s) IV Push every 6 hours PRN Agitation  sodium chloride 0.9% lock flush 10 milliLiter(s) IV Push every 1 hour PRN Pre/post blood products, medications, blood draw, and to maintain line patency      RADIOLOGY & ADDITIONAL TESTS:   1 pair

## 2021-06-28 NOTE — PATIENT DISCUSSION
Recommended LLL Entropion repair in [facility] [address] with Dr. Carlos Amaya for permanent relief in future.

## 2021-11-10 NOTE — PATIENT DISCUSSION
Recommended LLL Entropion repair in [address] with Dr. Watson Deluna for permanent relief in future.

## 2021-11-10 NOTE — PATIENT DISCUSSION
Discussed treatment options including in office quickert sutures vs entropion repair in [place].

## 2021-11-10 NOTE — PROCEDURE NOTE: CLINICAL
PROCEDURE NOTE: Entropion Repair, Suture Left Lower Lid. Diagnosis: Mechanical Ectropion. Anesthesia: Local. Risks, benefits and alternatives were discussed. Patient desires to proceed with entropion repair using dissolvable sutures today. A drop of proparacaine was instilled into the involved eye. A drop of tetracaine was placed on a cotton tipped applicator and applied to the inferior conjunctiva. The eyelid was anesthetized using 1% lidocaine with epi. Two individual 5-0 chromic gut sutures were placed in the lower eyelid to nila the eyelid into the correct position. Erythromycin ophthalmic ointment was placed on the sutures. The patient tolerated the procedure well and left in good condition. Martha Baird

## 2021-11-10 NOTE — PATIENT DISCUSSION
pt elects in-office quickert sutures today. Discussed temporary. Pt tolerated procedure well. PO instructions provided.

## 2022-01-09 NOTE — PROGRESS NOTE ADULT - PROBLEM/PLAN-7
No
DISPLAY PLAN FREE TEXT

## 2022-01-30 NOTE — PHYSICAL THERAPY INITIAL EVALUATION ADULT - HEALTH SCREEN CRITERIA
Came in through triage with complaints of nausea abdominal cramping and diarrhea since midnight.  Describes RLQ and RUQ stabbing pain.  Ate at restaurant last night.  Lg amount of emesis when roomed.   yes

## 2022-01-31 NOTE — H&P PST ADULT - EXTREMITIES
Cosentyx Pregnancy And Lactation Text: This medication is Pregnancy Category B and is considered safe during pregnancy. It is unknown if this medication is excreted in breast milk. detailed exam

## 2022-03-11 ENCOUNTER — COMPREHENSIVE EXAM (OUTPATIENT)
Dept: URBAN - METROPOLITAN AREA CLINIC 38 | Facility: CLINIC | Age: 67
End: 2022-03-11

## 2022-03-11 DIAGNOSIS — H04.123: ICD-10-CM

## 2022-03-11 DIAGNOSIS — H43.393: ICD-10-CM

## 2022-03-11 DIAGNOSIS — H25.813: ICD-10-CM

## 2022-03-11 DIAGNOSIS — H52.7: ICD-10-CM

## 2022-03-11 PROCEDURE — 92014 COMPRE OPH EXAM EST PT 1/>: CPT

## 2022-03-11 PROCEDURE — 92015 DETERMINE REFRACTIVE STATE: CPT

## 2022-03-11 ASSESSMENT — VISUAL ACUITY
OD_CC: 20/30
OU_CC: 20/20
OU_CC: J3
OS_CC: 20/30
OD_CC: J6
OS_CC: J3

## 2022-03-11 ASSESSMENT — TONOMETRY
OS_IOP_MMHG: 15
OD_IOP_MMHG: 15

## 2022-11-28 NOTE — ED PROVIDER NOTE - CRTICAL CARE TIME SPENT (MIN)
NEUROLOGY CONSULTATION NOTE:                             Lavern Aroldo     Referring provider: Dr. Karen Mota MD  Date of evaluation: 11/29/2022  YOB: 1984    Chief Complaint   Patient presents with   • Office Visit   • Migraine     Patient is accompanied by self.    HISTORY OF PRESENT ILLNESS:   Ms Baumann is a 37 year old RH female presents for follow up of migraines. She has a significant past medical  History of juvenile RA since childhood, well managed, CTS during pregnany, and migraines.     Previously saw Dr Rubinstein, last visit 3/5/21.    Migraines have overall been pretty good. The last two months, getting worse again. Knows triggers. Cycle related, usually mid cycle. Takes Fiorciet, but doesn't seem to help as much anymore. Usually gets 2-3 day headaches during cycle. Stress is big trigger. Has 3 kids, holidays. 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 She thinks that her recent increase in frequency of migraines is after recent illness, and she has been off her normal routine (diet, exercise, sleep, etc).    Last few months, migraines occuring mid cycle and during menses. Before, was just during mid month. Ca helps with hormonal headaches.     Usually will take excedrin and tylenol, is catches it early. If bad one, will escalate, and tries fiorcet next. Gets severe nausea, but tries to eat. Tries caffiene. Uses oils on head. Heat on neck/shoulders, ice on forehead. Works out regularly typically.     On: Fioricet PRN    Headache History:  1. Age of onset: post babies, during pregnancy 2nd trimester with first pregnancy  2. Headache frequency:persistent   3. Headache duration:can relieve it, but not fully. If catches it early, can stop it quick. Lately 2-3 day runs, usually mid cycle, and during cycle  4. Headache location: right sided, middle of head, little behind eye  5. Any associated event at the start of your first  headache?  neurological deficits? no  6. Severity on a scale of 1-10:  10/10 at worst.  7. Characteristics of Headache: throbbing, pressure   Photophobia/ Phonophobia/Nausea/neck pain/dizziness  8. Aggravating factors: stress, poor sleep, hydration, menstrual cycle, maybe weather  9. Alleviating factors: excedrin and tylenol, is catches it early. If bad one, will escalate, and tries fiorcet next. Gets severe nausea, but tries to eat. Tries caffiene. Uses oils on head. Heat on neck/shoulders, ice on forehead. Works out [place] typically.   10. Headache disability during an attack: has had to miss work  11. Proceeding Auras:  no  12. Headache associated with menstrual  Cycle: mid cycle, and during bleeding week   13. History of or recent head trauma or motor vehicle accident or being on a roller coaster or Jet ski: no  14. Headache Hygiene:                 Sleep 8 hours daily: no, 6 hours                Eat three meals daily: yes                Drinks 64 oz of water daily: close                Tobacco Abuse: no                ETOH  Consumption: rare     Caffeine: 1 cup coffee (small). Sometimes doesn't have it. Black tea sometimes                Stress at home/ work: 8, 6, 3 year old, lives with . Feels safe.       Exercise: 3 days a week, [employer]   15. Medications used in the past: excedrin (helps if early in headache), tylenol (some help with excedrin together), relpax (helped), fiorciet (not helping as much recently)  16. Family history of migraine: mom and sister. Mom had chiari malformation surgery   17. Family history of neurological diseases: grandma with glioblastoma later in life , gma with stroke older    Postdrome:burst of energy    Migraines per month: Nov: 5, Oct: 6, Sept:3   Mild headaches per month: doesn't count, doesn't notice  PRN use weekly: some weeks no se, two weeks, 2-3 days    Prior imaging? MRI cspine 3/21 WNL was advised PT for therapeutic massage and home exercise program.  Never had brain imaging.   Prior interventions? 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  History of JRA, previously on Humira and now on Rinvoc with improving arthralgias.  History of migraine without aura, catamenial, resolves with Fioricet, frequency 2-3 per month.  Previously seen in 12/2019 for neck pain and hand numbness post-partum, resolved with wrist splints.  She continues to have sharp midline neck pain, radiating to left shoulder, left hand is numb if she lies on her left side, and sometimes during the day.  No weakness or compromised activities.  Denies right arm/hand paresthesia.  Walking and balance are unaffected.  Minimal improvement in neck pain with soft tissue release in ~9/2020.    ·  Continue to use the Fioricet for migraine; if it is not working well we can use sumatriptan.  · We are obtaining a cervical spine MRI to exclude disc herniation or other pathology requiring intervention.  · Please schedule PT to include therapeutic massage and a home exercise program.  · If the hand tingling worsens, wear writs extension splint(s) overnight for 4-6 weeks and until fully resolved.  If these symptoms persist, we will do an EMG.      PAST MEDICAL, SURGICAL, FAMILY AND SOCIAL HISTORY:  Patient Active Problem List   Diagnosis   • Juvenile rheumatoid arthritis (CMS/HCC)   • Migraine without aura and without status migrainosus, not intractable   • Migraine   • Tingling sensation   • Neck pain   • Carpal tunnel syndrome on both sides   • History of 2019 novel coronavirus disease (COVID-19)   • Rheumatoid arthritis with rheumatoid factor (CMS/HCC)     Past Medical History:   Diagnosis Date   • GERD (gastroesophageal reflux disease)    • Juvenile rheumatoid arthritis (CMS/HCC)    • Migraine without aura and without status  migrainosus, not intractable    • Routine physical examination 07/02/2009     Past Surgical History:   Procedure Laterality Date   • Breast augmentation with implant     • Lasik surgery Bilateral 10/06/2022   • Tonsillectomy and adenoidectomy       Family History   Problem Relation Age of Onset   • Irritable Bowel Syndrome Mother    • Rheumatoid Arthritis Mother    • Celiac Disease Brother    • Cancer, Breast Maternal Grandmother    • Cancer, Breast Paternal Grandmother    • Cirrhosis Neg Hx    • Cancer, Colon Neg Hx    • Colon Polyps Neg Hx    • Crohn's Disease Neg Hx    • Cystic Fibrosis Neg Hx    • Cancer, Esophageal Neg Hx    • Hemochromatosis Neg Hx    • Inflammatory Bowel Disease Neg Hx    • Cancer, Liver Neg Hx    • Liver Disease Neg Hx    • Cancer, Rectal Neg Hx    • Cancer, Stomach Neg Hx    • Ulcerative Colitis Neg Hx      Social History     Tobacco Use   Smoking Status Never Smoker   Smokeless Tobacco Never Used     Patient Active Problem List     Substance and Sexual Activity   Drug Use No     Social History     Substance and Sexual Activity   Alcohol Use Yes    Comment: socially       ALLERGIES:  ALLERGIES:   Allergen Reactions   • Amoxicillin Other (See Comments)     Unknown   • Penicillins RASH         MEDICATIONS:    Current Outpatient Medications   Medication Sig Dispense Refill   • eletriptan (RELPAX) 20 MG tablet Take 1 tablet by mouth as needed for Migraine. Indications: Migraine Headache Take 1 tablet by mouth at onset of migraine. May repeat after 2 hours if needed. 12 tablet 11   • butalbital-APAP-caffeine (FIORICET) -40 MG per tablet Take 1 tablet by mouth every 4 hours as needed for Pain. 30 tablet 1   • famotidine (PEPCID) 40 MG tablet Take 40 mg by mouth at bedtime.     • benzonatate (TESSALON PERLES) 200 MG capsule      • drospirenone-ethinyl estradiol (LEESA) 3-0.02 MG per tablet Take 1 tablet by mouth daily. 90 tablet 4   • azithromycin (ZITHROMAX) 250 MG tablet Take 2 tablets at  once on the first day, then one tablet daily until finished 6 tablet 0   • Upadacitinib ER (Rinvoq) 15 MG TABLET SR 24 HR Take 1 tablet by mouth daily 90 tablet 3     No current facility-administered medications for this visit.       REVIEW OF SYSTEMS:  (except as listed above in HPI)  General:  No fevers, chills, excessive fatigue, loss of appetite or unexpected weight gain  Eyes:  No eye pain, vision change or vision loss   ENT: No hearing loss, ear pain, hoarse throat/voice or nasal congestion  Cardiovascular: No chest pain, palpitations/irregular heart beat, lightheadedness or edema in LEs  Respiratory: No shortness of breath, wheezing, snoring or coughing up blood  Gastrointestinal:  No blood in stools, abdominal pain or unexplained nausea/vomiting, hpylori in the summer,   Genitourinary: No blood in urine, pain with urination or inability to control urine  Endocrine:  No heat/cold intolerance, excessive thirst or appetite  Skin: no new rashes or change in mole  Hematologic:  No easy bruising, bleeding or on 'blood thinners'  Musculoskeletal: No joint pain-stable, joint swelling or muscle aches  Neurologic: per HPI  Psychiatric:  No anxiety, depression, insomnia or suicidal thoughts      PHYSICAL EXAM:  Vitals: Blood pressure 102/72, pulse (!) 54, height 5' 7\" (1.702 m), weight 76.8 kg (169 lb 5 oz), last menstrual period 10/29/2022.  Gen: No acute distress.   Head:  Normocephalic    ENT:  Normal pharynx  Neck:  Supple  Heart:  Normal rate   Extremities: No deformities.  Skin: No obvious rashes noted on body  Musculoskeletal:  Good ROM in limbs  Psych:  Affect was appropriate to situation and mood was normal.  Neurologic:  Mental status:  Awake and alert.   Oriented to person and place.  No aphasia or neglect. Patient is able to provide adequate history   Cranial nerves:   Pupils are equal and briskly reactive to light.  Extraocular muscles are intact. Normal visual fields to confrontation.  Facial sensation to  light touch is symmetric V1-V3 bilaterally. No facial asymmetry is present.  Hearing is grossly intact.  Soft palate elevates equally. Tongue is midline.  Shoulder shrug is symmetric..  Motor exam:  Strength in all 4 extremities is 5/5.   Sensory exam:  Normal sensation to light touch and vibration in all 4 extremities.  Cerebellar exam:  FNF and HTS are intact.   Reflexes:  Biceps, Brachioradialis, Triceps, Patellar, and Achilles 2/4, Babinski's were mute. No clonus  Gait:  Gait, Tandem, and Romberg were normal.    AOx4  mildl HA 2/10      TESTING/RESULTS/RECORDS REVIEWED:    WBC (K/mcL)   Date Value   03/30/2022 5.2     RBC (mil/mcL)   Date Value   03/30/2022 4.20     HCT (%)   Date Value   03/30/2022 41.0     HGB (g/dL)   Date Value   03/30/2022 13.3     PLT (K/mcL)   Date Value   03/30/2022 149     Sodium (mmol/L)   Date Value   03/30/2022 137     Potassium (mmol/L)   Date Value   03/30/2022 3.7     Chloride (mmol/L)   Date Value   03/30/2022 105     Glucose (mg/dL)   Date Value   03/30/2022 84     Calcium (mg/dL)   Date Value   03/30/2022 9.5     Carbon Dioxide (mmol/L)   Date Value   03/30/2022 24     BUN (mg/dL)   Date Value   03/30/2022 16     Creatinine (mg/dL)   Date Value   03/30/2022 0.93     Cholesterol (mg/dL)   Date Value   03/30/2022 150     HDL (mg/dL)   Date Value   03/30/2022 77     Cholesterol/ HDL Ratio (no units)   Date Value   03/30/2022 1.9     Triglycerides (mg/dL)   Date Value   03/30/2022 81     LDL (mg/dL)   Date Value   03/30/2022 57         ASSESSMENT:  Problem List Items Addressed This Visit        Neuro    Migraine without aura and without status migrainosus, not intractable - Primary          CLINICAL SUMMARY:  37 year old female with history of juvenile RA since childhood, CTS during pregnancy, and migraines presents for follow up of migraines. Previously saw Dr Rubinstein. No major changes in characteristics of headaches, but increased frequency recently, possibly in setting of  recent illness, changes in routine, triggers. Migraines without aura, with associated light/sound sensitivity, nausea. Has had to miss work. Typically related to menstrual cycle.   No prior brain imaging ordered by previous neurologist. No focal neurologic deficits on exam.    11/29/22: last month about 6 migraines, typically mid cycle and during menses. Previously 3 days on average. Fell off her normal routine with recent illness (recovering from bronchitis). Fiorcet not as helpful anymore.       PLAN/PATIENT INSTRUCTIONS:  -hold off on neuro imaging for now. If worsening headaches or change in characteristics, will order  -for headache prevention, can try  Magnesium oxide 400-600 mg nightly  -for as needed migraine treatment: Relpax (Eletriptan) 20 mg at onset of migraine, can repeat second dose in 2+ hours if needed. Can try with or without NSAID or tylenol. See what works for you. Can consider miniprophylaxis in future as option (Naproxen or triptan)  -foricet as needed, limit use to no more than 3 times per month  -remember to limit use of as needed medications (2 days per week) to prevent medication overuse headache  -keep headache diary  -follow up in 4 m or sooner if needed      Conservative strategies for headache prevention were emphasized.  A headache calendar was recommended.  I suggested the patient consider a daily exercise routine to help with headache prevention, and yoga in particular may be of benefit.  Regular sleep schedules encouraged, with 7-8 hours of sleep per night.  Recommended not skipping meals and staying well hydrated during the day with adequate water intake.  Conservative use of both caffeine and alcohol were recommended (1 alcoholic beverage per day maximum).  It was recommended that over the counter analgesics not be used more than 2-3 days per week on average.    Total time spent during the encounter[55 min]   Previsit- Reviewed prior clinic notes [5min]  Visit- Performed medically  appropriate history& physical examination.Surveillance labs & diagnostic studies-requested & reviewed, discussed diagnosis & prognosis, risks and benefits of treatment options, instructions for management, treatment, compliance, and follow up care. Patient and family education is provided.[40 min]  Post visit- Document the encounter [10min]  Nicole Encinas NP  [facility]   45

## 2023-02-23 NOTE — H&P ADULT - PROBLEM/PLAN-5
"Physical Therapy Treatment    Patient Name:  Jessica Bay   MRN:  [id number]    Recommendations:     Discharge Recommendations: rehabilitation facility, nursing facility, skilled  Discharge Equipment Recommendations: other (see comments) (to be determined)  Barriers to discharge:  ongoing medical treatment    Assessment:     Jessica Bay is a 68 y.o. female admitted with a medical diagnosis of Bacterial meningitis.  She presents with the following impairments/functional limitations: weakness, impaired endurance, impaired self care skills, decreased safety awareness.    Pt slowly improving with all mobility, however, required verbal cueing for safety during transfer to recliner chair. Pt opting not to use RW stating that she dose not use one at home for transfers    Rehab Prognosis: Good and Fair; patient would benefit from acute skilled PT services to address these deficits and reach maximum level of function.    Recent Surgery: * No surgery found *      Plan:     During this hospitalization, patient to be seen 5 x/week to address the identified rehab impairments via gait training, therapeutic activities, therapeutic exercises and progress toward the following goals:    Plan of Care Expires:  03/21/23    Subjective     Chief Complaint: bacterial meningitis  Patient/Family Comments/goals: "let me see what I can do by myself"  Pain/Comfort:         Objective:     Communicated with Aidan Gastelum RN prior to session.  Patient found HOB elevated with PureWick, bed alarm, peripheral IV upon PT entry to room.     General Precautions: Standard, fall, hearing impaired, droplet  Orthopedic Precautions: N/A  Braces: N/A  Respiratory Status: Room air     Functional Mobility:  Bed Mobility:     Supine to Sit: contact guard assistance and minimum assistance  Transfers:     Sit to Stand:  stand by assistance and for safety with no AD  Bed to Chair: stand by assistance, contact guard assistance, and for safety with  no AD  " using  Squat Pivot      AM-PAC 6 CLICK MOBILITY  Turning over in bed (including adjusting bedclothes, sheets and blankets)?: 3  Sitting down on and standing up from a chair with arms (e.g., wheelchair, bedside commode, etc.): 3  Moving from lying on back to sitting on the side of the bed?: 3  Moving to and from a bed to a chair (including a wheelchair)?: 3 (for safety)  Need to walk in hospital room?: 1  Climbing 3-5 steps with a railing?: 1  Basic Mobility Total Score: 14       Treatment & Education:  Pt performed 2 x 15 reps (B) LE exercises: ap, quad set, glut set, straight leg raise, hip ab/adduction, long arc quad, heel slide with active assist range of motion     Supervision to modified independent sitting EOB x 5 minutes    Patient left up in chair with all lines intact, call button in reach, and chair alarm on..    GOALS:   Multidisciplinary Problems       Physical Therapy Goals          Problem: Physical Therapy    Goal Priority Disciplines Outcome Goal Variances Interventions   Physical Therapy Goal     PT, PT/OT Ongoing, Progressing     Description: Short Term Goals to be met by: 3/7/23    Patient will increase functional independence with mobility by performin. Supine to sit with contact guard assist  2. Sit to stand transfer with contact guard assist using Rolling walker  3. Bed to chair transfer with contact guard assist using Rolling walker  4. Gait  x 100 feet with contact guard assist using Rolling walker  5. Lower extremity exercise program x30 reps per handout, with assistance as needed    Long Term Goals to be met by: 3/21/23    Pt will regain full independent functional mobility with Rolling walker to return to home situation and prior activities of daily living.                        Time Tracking:     PT Received On: 23  PT Start Time: 1325     PT Stop Time: 1358  PT Total Time (min): 33 min     Billable Minutes: Therapeutic Activity 8 and Therapeutic Exercise 15    Treatment  Type: Treatment  PT/PTA: PTA     PTA Visit Number: 2     02/23/2023   DISPLAY PLAN FREE TEXT

## 2023-03-27 NOTE — H&P PST ADULT - MAMMOGRAM, RESULTS OF LAST, PROFILE
[de-identified] : Left ear pain [FreeTextEntry6] : Patient is a 5-year-old male brought to office by mom for complaining about left ear pain for 1 day.  Patient stated in the morning that his neck hurt.  Patient has had no fever no vomiting no diarrhea eating and drinking well.  Patient's good friend with strep throat wnl, per pt

## 2023-06-14 NOTE — PROGRESS NOTE ADULT - PROBLEM SELECTOR PLAN 6
Look up [employer] for recipe idea    Follow guidelines for increasing fruit and vegetable intake    Include whole grains with meals    Keep 3 day food diary    Bring in menu ideas to next visit and any food labels you have been using as well Admitted with Hgb 9.7. Hgb was 13.0 in February 2017.  - may be 2/2 chronic blood loss anemia vs iron deficiency  - will send FOBT Admitted with Hgb 9.7. Hgb was 13.0 in February 2017.  - AOCD per iron panel.

## 2023-07-13 NOTE — DISCHARGE NOTE ADULT - CARE PROVIDERS DIRECT ADDRESSES
,[email or web address] Flap Text: The defect edges were debeveled with a #15 scalpel blade. Given the location of the defect, shape of the defect and the proximity to free margins a V-Y flap was deemed most appropriate. Using a sterile surgical marker, an appropriate advancement flap was drawn incorporating the defect and placing the expected incisions within the relaxed skin tension lines where possible. The area thus outlined was incised deep to adipose tissue with a #15 scalpel blade. The skin margins were undermined to an appropriate distance in all directions utilizing iris scissors. Following this, the designed flap was advanced and carried over into the primary defect and sutured into place.

## 2023-09-14 ENCOUNTER — COMPREHENSIVE EXAM (OUTPATIENT)
Dept: URBAN - METROPOLITAN AREA CLINIC 35 | Facility: CLINIC | Age: 68
End: 2023-09-14

## 2023-09-14 DIAGNOSIS — H18.453: ICD-10-CM

## 2023-09-14 DIAGNOSIS — H25.813: ICD-10-CM

## 2023-09-14 DIAGNOSIS — H04.123: ICD-10-CM

## 2023-09-14 DIAGNOSIS — H43.393: ICD-10-CM

## 2023-09-14 DIAGNOSIS — H52.7: ICD-10-CM

## 2023-09-14 PROCEDURE — 92014 COMPRE OPH EXAM EST PT 1/>: CPT

## 2023-09-14 PROCEDURE — 92015 DETERMINE REFRACTIVE STATE: CPT

## 2023-09-14 ASSESSMENT — VISUAL ACUITY
OS_PH: 20/30-2
OS_CC: J3-
OU_CC: J2
OS_CC: 20/40+2
OD_CC: 20/25-2
OU_CC: 20/20-2
OD_CC: J3

## 2023-09-14 ASSESSMENT — TONOMETRY
OD_IOP_MMHG: 13
OS_IOP_MMHG: 15

## 2023-10-28 NOTE — PATIENT DISCUSSION
Recommended LLL Entropion repair in [place] [address] with Dr. Daniel Pulido for permanent relief in future. 28-Oct-2023

## 2024-03-25 NOTE — H&P ADULT - PROBLEM SELECTOR PLAN 4
Group Topic: BH CBT    Date: 3/25/2024  Start Time: 1040  End Time: 1 Viola Tubbs  Facilitators: Gibran Paiz    Focus: Adverse Childhood Experiences   Number in attendance: 5    Method: Group  Attendance: Present  Participation: Active  Patient Response: Appropriate feedback, Attentive, Good eye contact, Interested in topic, and Interactive  Mood: Normal  Affect: Type: Euthymic (normal mood)   Range: Full (normal)   Congruency: Congruent   Stability: Stable  Behavior/Socialization: Appropriate to group, Cooperative, and Engaged  Thought Process: Unremarkable  Task Performance:  Follows directions  Patient Evaluation: Independent - full participation Admitted with sodium 134.  - likely hypovolemia 2/2 reduced po intake  - s/p 3.5L IVFs  - will recheck BMP

## 2024-07-09 ENCOUNTER — EMERGENCY VISIT (OUTPATIENT)
Dept: URBAN - METROPOLITAN AREA CLINIC 35 | Facility: CLINIC | Age: 69
End: 2024-07-09

## 2024-07-09 DIAGNOSIS — H04.123: ICD-10-CM

## 2024-07-09 DIAGNOSIS — H43.393: ICD-10-CM

## 2024-07-09 DIAGNOSIS — H25.813: ICD-10-CM

## 2024-07-09 DIAGNOSIS — H18.453: ICD-10-CM

## 2024-07-09 PROCEDURE — 92250 FUNDUS PHOTOGRAPHY W/I&R: CPT

## 2024-07-09 PROCEDURE — 92014 COMPRE OPH EXAM EST PT 1/>: CPT

## 2024-07-09 ASSESSMENT — VISUAL ACUITY
OS_CC: 20/25
OD_CC: 20/25-1
OU_CC: 20/20-2

## 2024-07-09 ASSESSMENT — TONOMETRY
OD_IOP_MMHG: 16
OS_IOP_MMHG: 15

## 2024-08-23 NOTE — DISCHARGE NOTE ADULT - MEDICATION SUMMARY - MEDICATIONS TO TAKE
Patient called requesting to speak to . Per patient he received a letter in the mail from provider stating they've been trying to reach him. Per patient he never had a call from provider or . Patient requested a call back as soon as possible.       Thanks!    I will START or STAY ON the medications listed below when I get home from the hospital:    calcium  -- 500 milligram(s) by mouth once a day  -- Indication: For SUPPLEMENT    acetaminophen 325 mg oral tablet  -- 2 tab(s) by mouth every 6 hours, As needed, Mild Pain (1 - 3)  -- Indication: For PAIN    Inderal 20 mg oral tablet  -- 1 tab(s) by mouth 3 times a day  -- Indication: For HEART PROTECTION    KlonoPIN 1 mg oral tablet  -- 1 tab(s) by mouth 3 times a day  -- Indication: For MOOD    CeleXA 20 mg oral tablet  -- 1 tab(s) by mouth once a day  -- Indication: For MOOD     Lipitor 10 mg oral tablet  -- 1 tab(s) by mouth once a day  -- Indication: For CHOLESTEROL    Halcion 0.25 mg oral tablet  -- 1 tab(s) by mouth once a day (at bedtime)  -- Indication: For MOOD     docusate sodium 100 mg oral capsule  -- 1 cap(s) by mouth 3 times a day  -- Indication: For STOOL SOFTNER     multivitamin  -- 1 tab(s) by mouth once a day  -- Indication: For VITAMIN    Vitamin D3 1000 intl units oral capsule  -- 1 cap(s) by mouth once a day  -- Indication: For VITAMIN I will START or STAY ON the medications listed below when I get home from the hospital:    calcium  -- 500 milligram(s) by mouth once a day  -- Indication: For SUPPLEMENT    acetaminophen 325 mg oral tablet  -- 2 tab(s) by mouth every 6 hours, As needed, Mild Pain (1 - 3)  -- Indication: For PAIN    Inderal 20 mg oral tablet  -- 1 tab(s) by mouth 3 times a day  -- Indication: For HEART PROTECTION    KlonoPIN 1 mg oral tablet  -- 1 tab(s) by mouth 3 times a day  -- Indication: For tremor     CeleXA 20 mg oral tablet  -- 1 tab(s) by mouth once a day  -- Indication: For tremor /mood     Lipitor 10 mg oral tablet  -- 1 tab(s) by mouth once a day  -- Indication: For CHOLESTEROL    Halcion 0.25 mg oral tablet  -- 1 tab(s) by mouth once a day (at bedtime)  -- Indication: For tremor     docusate sodium 100 mg oral capsule  -- 1 cap(s) by mouth 3 times a day  -- Indication: For STOOL SOFTNER     multivitamin  -- 1 tab(s) by mouth once a day  -- Indication: For VITAMIN    Vitamin D3 1000 intl units oral capsule  -- 1 cap(s) by mouth once a day  -- Indication: For VITAMIN

## 2025-07-23 ENCOUNTER — COMPREHENSIVE EXAM (OUTPATIENT)
Age: 70
End: 2025-07-23

## 2025-07-23 DIAGNOSIS — H52.7: ICD-10-CM

## 2025-07-23 DIAGNOSIS — H43.393: ICD-10-CM

## 2025-07-23 DIAGNOSIS — H25.813: ICD-10-CM

## 2025-07-23 DIAGNOSIS — H04.123: ICD-10-CM

## 2025-07-23 DIAGNOSIS — H18.453: ICD-10-CM

## 2025-07-23 PROCEDURE — 92250 FUNDUS PHOTOGRAPHY W/I&R: CPT

## 2025-07-23 PROCEDURE — 92014 COMPRE OPH EXAM EST PT 1/>: CPT

## 2025-07-23 PROCEDURE — 92015 DETERMINE REFRACTIVE STATE: CPT

## 2025-08-26 ENCOUNTER — CONSULTATION/EVALUATION (OUTPATIENT)
Age: 70
End: 2025-08-26

## 2025-08-26 DIAGNOSIS — H25.813: ICD-10-CM

## 2025-08-26 DIAGNOSIS — H43.393: ICD-10-CM

## 2025-08-26 DIAGNOSIS — H04.123: ICD-10-CM

## 2025-08-26 DIAGNOSIS — H52.7: ICD-10-CM

## 2025-08-26 DIAGNOSIS — H18.453: ICD-10-CM

## 2025-08-26 PROCEDURE — 92134 CPTRZ OPH DX IMG PST SGM RTA: CPT | Mod: NC

## 2025-08-26 PROCEDURE — 92136 OPHTHALMIC BIOMETRY: CPT

## 2025-08-26 PROCEDURE — 92025 CPTRIZED CORNEAL TOPOGRAPHY: CPT

## 2025-08-26 PROCEDURE — 99214 OFFICE O/P EST MOD 30 MIN: CPT

## 2025-08-26 RX ORDER — PREDNISOLONE ACETATE 10 MG/ML: 1 SUSPENSION/ DROPS OPHTHALMIC

## 2025-08-26 RX ORDER — CYCLOSPORINE OPHTHALMIC SOLUTION 1 MG/ML: 1 SOLUTION/ DROPS OPHTHALMIC TWICE A DAY

## 2025-08-26 RX ORDER — MOXIFLOXACIN OPHTHALMIC 5 MG/ML: 1 SOLUTION/ DROPS OPHTHALMIC
